# Patient Record
Sex: FEMALE | Race: WHITE | URBAN - METROPOLITAN AREA
[De-identification: names, ages, dates, MRNs, and addresses within clinical notes are randomized per-mention and may not be internally consistent; named-entity substitution may affect disease eponyms.]

---

## 2021-09-30 ENCOUNTER — HOSPITAL ENCOUNTER (INPATIENT)
Facility: HOSPITAL | Age: 61
LOS: 5 days | Discharge: HOME | DRG: 479 | End: 2021-10-05
Attending: EMERGENCY MEDICINE | Admitting: HOSPITALIST
Payer: COMMERCIAL

## 2021-09-30 DIAGNOSIS — M54.9 INTRACTABLE BACK PAIN: Primary | ICD-10-CM

## 2021-09-30 DIAGNOSIS — R93.7 ABNORMAL COMPUTED TOMOGRAPHY OF LUMBAR SPINE: ICD-10-CM

## 2021-09-30 DIAGNOSIS — R79.89 AZOTEMIA: ICD-10-CM

## 2021-09-30 PROCEDURE — 96374 THER/PROPH/DIAG INJ IV PUSH: CPT

## 2021-09-30 PROCEDURE — 99285 EMERGENCY DEPT VISIT HI MDM: CPT

## 2021-09-30 PROCEDURE — 85610 PROTHROMBIN TIME: CPT | Performed by: EMERGENCY MEDICINE

## 2021-09-30 PROCEDURE — 80053 COMPREHEN METABOLIC PANEL: CPT | Performed by: EMERGENCY MEDICINE

## 2021-09-30 PROCEDURE — 85025 COMPLETE CBC W/AUTO DIFF WBC: CPT | Performed by: EMERGENCY MEDICINE

## 2021-09-30 PROCEDURE — 96375 TX/PRO/DX INJ NEW DRUG ADDON: CPT

## 2021-09-30 PROCEDURE — 81001 URINALYSIS AUTO W/SCOPE: CPT | Performed by: EMERGENCY MEDICINE

## 2021-09-30 RX ORDER — ONDANSETRON 2 MG/ML
4 INJECTION INTRAMUSCULAR; INTRAVENOUS ONCE
Status: COMPLETED | OUTPATIENT
Start: 2021-09-30 | End: 2021-09-30

## 2021-09-30 RX ORDER — ACETAMINOPHEN 325 MG/1
650 TABLET ORAL EVERY 6 HOURS PRN
Status: DISCONTINUED | OUTPATIENT
Start: 2021-09-30 | End: 2021-10-05

## 2021-09-30 RX ORDER — HYDROMORPHONE HYDROCHLORIDE 1 MG/ML
1 INJECTION, SOLUTION INTRAMUSCULAR; INTRAVENOUS; SUBCUTANEOUS EVERY 30 MIN PRN
Status: DISCONTINUED | OUTPATIENT
Start: 2021-09-30 | End: 2021-09-30

## 2021-09-30 RX ORDER — HYDROMORPHONE HYDROCHLORIDE 1 MG/ML
0.5 INJECTION, SOLUTION INTRAMUSCULAR; INTRAVENOUS; SUBCUTANEOUS EVERY 30 MIN PRN
Status: DISCONTINUED | OUTPATIENT
Start: 2021-09-30 | End: 2021-09-30

## 2021-09-30 RX ORDER — HYDROMORPHONE HYDROCHLORIDE 1 MG/ML
0.8 INJECTION, SOLUTION INTRAMUSCULAR; INTRAVENOUS; SUBCUTANEOUS EVERY 2 HOUR PRN
Status: DISCONTINUED | OUTPATIENT
Start: 2021-09-30 | End: 2021-10-05

## 2021-09-30 RX ORDER — HYDROMORPHONE HYDROCHLORIDE 1 MG/ML
0.2 INJECTION, SOLUTION INTRAMUSCULAR; INTRAVENOUS; SUBCUTANEOUS EVERY 2 HOUR PRN
Status: DISCONTINUED | OUTPATIENT
Start: 2021-09-30 | End: 2021-10-05

## 2021-09-30 RX ORDER — DOCUSATE SODIUM 100 MG/1
100 CAPSULE, LIQUID FILLED ORAL 2 TIMES DAILY
COMMUNITY
End: 2021-10-08

## 2021-09-30 RX ORDER — HYDROMORPHONE HYDROCHLORIDE 1 MG/ML
0.4 INJECTION, SOLUTION INTRAMUSCULAR; INTRAVENOUS; SUBCUTANEOUS EVERY 2 HOUR PRN
Status: DISCONTINUED | OUTPATIENT
Start: 2021-09-30 | End: 2021-10-05

## 2021-09-30 RX ORDER — ONDANSETRON 2 MG/ML
4 INJECTION INTRAMUSCULAR; INTRAVENOUS EVERY 4 HOURS PRN
Status: DISCONTINUED | OUTPATIENT
Start: 2021-09-30 | End: 2021-09-30

## 2021-09-30 RX ORDER — PANTOPRAZOLE SODIUM 20 MG/1
20 TABLET, DELAYED RELEASE ORAL
Status: DISCONTINUED | OUTPATIENT
Start: 2021-10-01 | End: 2021-10-05

## 2021-09-30 RX ORDER — DOCUSATE SODIUM 100 MG/1
100 CAPSULE, LIQUID FILLED ORAL 2 TIMES DAILY
Status: DISCONTINUED | OUTPATIENT
Start: 2021-09-30 | End: 2021-10-05

## 2021-09-30 RX ORDER — ONDANSETRON 2 MG/ML
4 INJECTION INTRAMUSCULAR; INTRAVENOUS EVERY 6 HOURS PRN
Status: DISCONTINUED | OUTPATIENT
Start: 2021-09-30 | End: 2021-10-05

## 2021-09-30 RX ORDER — TIZANIDINE 4 MG/1
4 TABLET ORAL EVERY 8 HOURS PRN
Status: DISCONTINUED | OUTPATIENT
Start: 2021-09-30 | End: 2021-10-05

## 2021-10-01 ENCOUNTER — APPOINTMENT (OUTPATIENT)
Dept: CT IMAGING | Facility: HOSPITAL | Age: 61
DRG: 479 | End: 2021-10-01
Attending: INTERNAL MEDICINE
Payer: COMMERCIAL

## 2021-10-01 PROCEDURE — 77012 CT SCAN FOR NEEDLE BIOPSY: CPT | Performed by: INTERNAL MEDICINE

## 2021-10-01 PROCEDURE — 99152 MOD SED SAME PHYS/QHP 5/>YRS: CPT | Performed by: INTERNAL MEDICINE

## 2021-10-01 PROCEDURE — 88304 TISSUE EXAM BY PATHOLOGIST: CPT | Performed by: INTERNAL MEDICINE

## 2021-10-01 PROCEDURE — 20225 BONE BIOPSY TROCAR/NDL DEEP: CPT | Performed by: INTERNAL MEDICINE

## 2021-10-01 PROCEDURE — 88342 IMHCHEM/IMCYTCHM 1ST ANTB: CPT | Performed by: INTERNAL MEDICINE

## 2021-10-01 PROCEDURE — 0QB03ZX EXCISION OF LUMBAR VERTEBRA, PERCUTANEOUS APPROACH, DIAGNOSTIC: ICD-10-PCS | Performed by: RADIOLOGY

## 2021-10-01 PROCEDURE — 88312 SPECIAL STAINS GROUP 1: CPT | Performed by: INTERNAL MEDICINE

## 2021-10-01 PROCEDURE — 99153 MOD SED SAME PHYS/QHP EA: CPT | Performed by: INTERNAL MEDICINE

## 2021-10-01 PROCEDURE — 88365 INSITU HYBRIDIZATION (FISH): CPT | Performed by: INTERNAL MEDICINE

## 2021-10-01 PROCEDURE — 88311 DECALCIFY TISSUE: CPT | Performed by: INTERNAL MEDICINE

## 2021-10-01 PROCEDURE — 88341 IMHCHEM/IMCYTCHM EA ADD ANTB: CPT | Performed by: INTERNAL MEDICINE

## 2021-10-01 PROCEDURE — 96375 TX/PRO/DX INJ NEW DRUG ADDON: CPT

## 2021-10-01 RX ORDER — MIDAZOLAM HYDROCHLORIDE 1 MG/ML
INJECTION INTRAMUSCULAR; INTRAVENOUS
Status: COMPLETED
Start: 2021-10-01 | End: 2021-10-01

## 2021-10-01 RX ORDER — FLUMAZENIL 0.1 MG/ML
0.2 INJECTION, SOLUTION INTRAVENOUS AS NEEDED
Status: DISCONTINUED | OUTPATIENT
Start: 2021-10-01 | End: 2021-10-01 | Stop reason: HOSPADM

## 2021-10-01 RX ORDER — HYDROCODONE BITARTRATE AND ACETAMINOPHEN 10; 325 MG/1; MG/1
1 TABLET ORAL EVERY 4 HOURS PRN
Status: DISCONTINUED | OUTPATIENT
Start: 2021-10-01 | End: 2021-10-05

## 2021-10-01 RX ORDER — NALOXONE HYDROCHLORIDE 0.4 MG/ML
80 INJECTION, SOLUTION INTRAMUSCULAR; INTRAVENOUS; SUBCUTANEOUS AS NEEDED
Status: DISCONTINUED | OUTPATIENT
Start: 2021-10-01 | End: 2021-10-01 | Stop reason: HOSPADM

## 2021-10-01 RX ORDER — HYDROCODONE BITARTRATE AND ACETAMINOPHEN 10; 325 MG/1; MG/1
2 TABLET ORAL EVERY 4 HOURS PRN
Status: DISCONTINUED | OUTPATIENT
Start: 2021-10-01 | End: 2021-10-05

## 2021-10-01 RX ORDER — SODIUM CHLORIDE 9 MG/ML
INJECTION, SOLUTION INTRAVENOUS CONTINUOUS
Status: DISCONTINUED | OUTPATIENT
Start: 2021-10-01 | End: 2021-10-01 | Stop reason: HOSPADM

## 2021-10-01 RX ORDER — MIDAZOLAM HYDROCHLORIDE 1 MG/ML
1 INJECTION INTRAMUSCULAR; INTRAVENOUS EVERY 5 MIN PRN
Status: DISCONTINUED | OUTPATIENT
Start: 2021-10-01 | End: 2021-10-01 | Stop reason: HOSPADM

## 2021-10-01 NOTE — H&P
[facility] Hospitalist H&P       CC: Patient presents with:  Back Pain       PCP: Justin Vogt MD    History of Present Illness:    Patient is a 57-year-old female with past medical history of GERD, obesity, hyperlipidemia, presents from oncologist office for an Obesity (BMI 30.0-34. 9)    • Skin cancer, upper extremity, left     MOHS        PSH  Past Surgical History:   Procedure Laterality Date   • COLONOSCOPY      2019 Murel Senia ? 10 yr recall   • KNEE ARTHROSCOPY      left knee meniscal        ALL:  No Known Aller 97.3 °F (36.3 °C) (Oral)   Resp 16   Ht 5' 1\" (1.549 m)   Wt 166 lb (75.3 kg)   LMP 08/01/2007 (Approximate)   SpO2 97%   BMI 31.37 kg/m²     BP Readings from Last 3 Encounters:  10/01/21 : 135/77  09/30/21 : 122/78  09/22/21 : 124/78    Wt Readings from -CT abdomen pelvis pending read  -We will try to obtain records of the outside MRI  -Bone scan with pagets disease and compression fracture of the superior end plate of L4   -Heme-onc consulted, -  -PT OT  -IR for CT-guided biopsy and kyphoplasty?  -Pain

## 2021-10-01 NOTE — PLAN OF CARE
Pt A & O x 4, on 2L oxygen after IV pain meds given. /IS. NPO for CT biopsy at 1400 today. SCDs. Last BM 9/30. IV dilaudid PRN pain. Up x sba. Pt c/o muscle spasms that come and go. Will continue to monitor.

## 2021-10-01 NOTE — PLAN OF CARE
NURSING ADMISSION NOTE      Patient admitted via Cart  Oriented to room. Safety precautions initiated. Bed in low position. Call light in reach. Received from ED awake, A/Ox4. Afebrile, /63.  Ambulated independently to toilet, gait steady, void

## 2021-10-01 NOTE — IMAGING NOTE
Pt post lumbar spine bone biopsy. Tolerated procedure and sedation well. Pain not controlled not responding to pain meds. . Biospy site lower lumbar CDI with tegaderm. Alert and oriented. RN to escort to floor. Chay Mcintyre

## 2021-10-01 NOTE — ED PROVIDER NOTES
Patient Seen in: [facility] Emergency Department      History   Patient presents with:  Back Pain    Stated Complaint: back pain     Subjective:   HPI    75-year-old female presents to the emergency department for hospitalization for pain control an are as noted in HPI. Constitutional and vital signs reviewed. All other systems reviewed and negative except as noted above.     Physical Exam     ED Triage Vitals [09/30/21 1756]   /79   Pulse 88   Resp 17   Temp 97.6 °F (36.4 °C)   Temp src Te Normal   CBC WITH DIFFERENTIAL WITH PLATELET    Narrative: The following orders were created for panel order CBC With Differential With Platelet.   Procedure                               Abnormality         Status                     ---------

## 2021-10-01 NOTE — CONSULTS
[facility]  Report of Consultation    Samara Iyer Patient Status:  Observation    9/3/1960 MRN [id number]   [facility] 3SW-A Attending Edgardo Rincon DO   Hosp Day # 0 PCP Amanda Ellison MD     ADMIT DATE AND TIME: 2021  8:11 drugs. Allergies:  No Known Allergies    Medications:    Current Facility-Administered Medications:   •  HYDROmorphone HCl (DILAUDID) 1 MG/ML injection 0.2 mg, 0.2 mg, Intravenous, Q2H PRN **OR** HYDROmorphone HCl (DILAUDID) 1 MG/ML injection 0.4 mg, 0. 8.5 - 10.1 mg/dL    Calculated Osmolality 280 275 - 295 mOsm/kg    GFR, Non- 94 >=60    GFR, -American 109 >=60    AST 34 15 - 37 U/L    ALT 60 (H) 13 - 56 U/L    Alkaline Phosphatase 143 (H) 50 - 130 U/L    Bilirubin, Total 0.5 0.1 x10 (3) uL    Neutrophil Absolute 9.74 (H) 1.50 - 7.70 x10(3) uL    Lymphocyte Absolute 1.19 1.00 - 4.00 x10(3) uL    Monocyte Absolute 0.69 0.10 - 1.00 x10(3) uL    Eosinophil Absolute 0.03 0.00 - 0.70 x10(3) uL    Basophil Absolute 0.03 0.00 - 0.20 x10(3

## 2021-10-01 NOTE — PROCEDURES
[facility]  Procedure Note    Cindy Loliradha Patient Status:  Observation    9/3/1960 MRN [id number]   [facility] 3SW-A Attending Lydia Avalos MD   Hosp Day # 0 PCP Ilana Dunbar MD     Procedure: CT guided bone biopsy    Pre-Procedu

## 2021-10-02 NOTE — PLAN OF CARE
Plan of care discussed with patient this am. She is up ad rubi in room, tolerating walking and standing, states her pain worse with sitting and lying. States she had hard time sleeping last night. Back gauze/telfa drsg noted intact with old drainage noted.

## 2021-10-02 NOTE — PLAN OF CARE
Zanaflex given for lower back muscle spasms with good result. Pt able to sleep afterwards. Pt prefers to sit up in the chair or have [address] on sitting position, unable to tolerate laying flat in bed d/t to pain.  Moves all extremities without difficulty, no n/

## 2021-10-02 NOTE — PROGRESS NOTES
[facility] Hospitalist Progress Note     [facility]      SUBJECTIVE:    Patient states pain is much better controlled stopped all IV Dilaudid and has been only on half of Norco and pain is well controlled she really wants to get the kyphoplasty done lashaun manual techniques for patient specific dose reduction were followed while maintaining the necessary diagnostic image quality. ADVERSE REACTION: None. FINDINGS: LUNG BASES: There is a calcified granuloma in the right lung middle lobe.  There is linear atelec vertebral body  DESCRIPTION:  Witnessed verbal and written informed consent was obtained. Time out was performed by the staff. A CT-guided biopsy was performed in the usual sterile manner. Dose reduction techniques were used.  Dose information is transmit spine 9/21/2021. Previous report from MRI is unavailable. TECHNIQUE:   27.4 mCi of technetium 99m-labeled MDP were administered intravenously, followed by the acquisition of total body static planar images.  SPECT-CT images of the lumbar spine and pelvis, i obesity, hyperlipidemia, presents from oncologist office for an L1 and L4 vertebral lesion and low back pain     #Low back pain  #L1 L4 vertebral lesion marrow edema  #pagets disease   -CT abdomen pelvis -compatible with compatible with Paget's disease wit

## 2021-10-03 PROCEDURE — 80048 BASIC METABOLIC PNL TOTAL CA: CPT | Performed by: HOSPITALIST

## 2021-10-03 PROCEDURE — 85025 COMPLETE CBC W/AUTO DIFF WBC: CPT | Performed by: HOSPITALIST

## 2021-10-03 NOTE — PLAN OF CARE
Lower back pain and spasms well managed with Norco and Tizanidine. Remains intolerant of laying flat in bed, more comfortable when sitting, standing and ambulating. Low grade temperature last night, encouraged hourly use of IS, coughing and deep breathing.

## 2021-10-03 NOTE — PLAN OF CARE
Plan of care discussed with patient this am. Patient up in room, washed this am in bathroom. She is still unable to lay flat or down in bed, due to severe pain in lower back.  She is up in chair and walking in room/halls freq with her own walking cane from

## 2021-10-03 NOTE — PROGRESS NOTES
[facility] Hospitalist Progress Note     [facility]      SUBJECTIVE:    Doing well pain well controlled chest normal pills awaiting kyphoplasty by IR tomorrow-we will confirm with IR schedule so patient can get kyphoplasty insistent that her pain is uncont Isovue 370 were administered intravenously. Automated exposure control and ALARA manual techniques for patient specific dose reduction were followed while maintaining the necessary diagnostic image quality. ADVERSE REACTION: None.  FINDINGS: LUNG BASES: The (CPT=77012/15147)  COMPARISON:  None. INDICATIONS:  Sclerotic lesion of L4 vertebral body  DESCRIPTION:  Witnessed verbal and written informed consent was obtained. Time out was performed by the staff.  A CT-guided biopsy was performed in the usual steril without sciatica. COMPARISON: CT abdomen pelvis 9/30/2021, outside MRI lumbar spine 9/21/2021. Previous report from MRI is unavailable.  TECHNIQUE:   27.4 mCi of technetium 99m-labeled MDP were administered intravenously, followed by the acquisition of tota PRN         Assessment/Plan:     59year old female past medical history of GERD, obesity, hyperlipidemia, presents from oncologist office for an L1 and L4 vertebral lesion and low back pain     #Low back pain  #L1 L4 vertebral lesion marrow edema  #pagets

## 2021-10-04 ENCOUNTER — APPOINTMENT (OUTPATIENT)
Dept: INTERVENTIONAL RADIOLOGY/VASCULAR | Facility: HOSPITAL | Age: 61
DRG: 479 | End: 2021-10-04
Attending: HOSPITALIST
Payer: COMMERCIAL

## 2021-10-04 PROCEDURE — 0QU03JZ SUPPLEMENT LUMBAR VERTEBRA WITH SYNTHETIC SUBSTITUTE, PERCUTANEOUS APPROACH: ICD-10-PCS | Performed by: RADIOLOGY

## 2021-10-04 PROCEDURE — 80048 BASIC METABOLIC PNL TOTAL CA: CPT | Performed by: HOSPITALIST

## 2021-10-04 PROCEDURE — 22514 PERQ VERTEBRAL AUGMENTATION: CPT

## 2021-10-04 PROCEDURE — 99152 MOD SED SAME PHYS/QHP 5/>YRS: CPT

## 2021-10-04 PROCEDURE — 99153 MOD SED SAME PHYS/QHP EA: CPT

## 2021-10-04 PROCEDURE — 0QS03ZZ REPOSITION LUMBAR VERTEBRA, PERCUTANEOUS APPROACH: ICD-10-PCS | Performed by: RADIOLOGY

## 2021-10-04 PROCEDURE — 85025 COMPLETE CBC W/AUTO DIFF WBC: CPT | Performed by: HOSPITALIST

## 2021-10-04 RX ORDER — LIDOCAINE HYDROCHLORIDE 10 MG/ML
INJECTION, SOLUTION INFILTRATION; PERINEURAL
Status: COMPLETED
Start: 2021-10-04 | End: 2021-10-04

## 2021-10-04 RX ORDER — DOCUSATE SODIUM 100 MG/1
100 CAPSULE, LIQUID FILLED ORAL 2 TIMES DAILY
Status: DISCONTINUED | OUTPATIENT
Start: 2021-10-04 | End: 2021-10-04

## 2021-10-04 RX ORDER — POLYETHYLENE GLYCOL 3350 17 G/17G
17 POWDER, FOR SOLUTION ORAL DAILY PRN
Status: DISCONTINUED | OUTPATIENT
Start: 2021-10-04 | End: 2021-10-05

## 2021-10-04 RX ORDER — CEFAZOLIN SODIUM/WATER 2 G/20 ML
SYRINGE (ML) INTRAVENOUS
Status: COMPLETED
Start: 2021-10-04 | End: 2021-10-04

## 2021-10-04 RX ORDER — SODIUM PHOSPHATE, DIBASIC AND SODIUM PHOSPHATE, MONOBASIC 7; 19 G/133ML; G/133ML
1 ENEMA RECTAL ONCE AS NEEDED
Status: DISCONTINUED | OUTPATIENT
Start: 2021-10-04 | End: 2021-10-05

## 2021-10-04 RX ORDER — MIDAZOLAM HYDROCHLORIDE 1 MG/ML
INJECTION INTRAMUSCULAR; INTRAVENOUS
Status: COMPLETED
Start: 2021-10-04 | End: 2021-10-04

## 2021-10-04 RX ORDER — BISACODYL 10 MG
10 SUPPOSITORY, RECTAL RECTAL
Status: DISCONTINUED | OUTPATIENT
Start: 2021-10-04 | End: 2021-10-05

## 2021-10-04 NOTE — PROCEDURES
[facility]  Procedure Note    Ashlee Carnes Patient Status:  Inpatient    9/3/1960 MRN [id number]   [facility]   Hosp Day # 4 PCP Sushila Herrmann MD     Procedure: L4 kyphoplasty    Pre-

## 2021-10-04 NOTE — PROGRESS NOTES
[facility]  Progress Note    Olman Lazar Patient Status:  Observation    9/3/1960 MRN [id number]   [facility] 3SW-A Attending El Castro MD   Hosp Day # 0 PCP Abel Galvan MD     ADMISSION DATE AND TIME: 2021  8:11 PM    A x10(3) uL    Eosinophil Absolute 0.11 0.00 - 0.70 x10(3) uL    Basophil Absolute 0.03 0.00 - 0.20 x10(3) uL    Immature Granulocyte Absolute 0.02 0.00 - 1.00 x10(3) uL    Neutrophil % 57.6 %    Lymphocyte % 26.0 %    Monocyte % 12.9 %    Eosinophil % 2.4 %

## 2021-10-04 NOTE — PLAN OF CARE
Pt A&Ox4. VSS on room air- IS encouraged. Refusing SCDs. Puncture site to lower back dressed w/ gauze, Telfa- small amount sanguineous drainage. Tolerating regular diet. Voiding w/o difficulty; last BM 9/30/2021- bowel regimen ordered.  Pain controlled w/ I

## 2021-10-04 NOTE — PLAN OF CARE
A/o x4. RA/. Declines SCD's/ankle pumps encouraged. NPO denies n/v/d. Voiding without difficulty. LBM 9/30. Pain managed with Po pain medication pt declines IV pain medication pt states she doesn't how they make her feel. Up ad rubi with personal cane.

## 2021-10-05 VITALS
DIASTOLIC BLOOD PRESSURE: 63 MMHG | HEART RATE: 80 BPM | TEMPERATURE: 98 F | HEIGHT: 61 IN | OXYGEN SATURATION: 93 % | SYSTOLIC BLOOD PRESSURE: 115 MMHG | WEIGHT: 166 LBS | BODY MASS INDEX: 31.34 KG/M2 | RESPIRATION RATE: 18 BRPM

## 2021-10-05 PROCEDURE — 97116 GAIT TRAINING THERAPY: CPT

## 2021-10-05 PROCEDURE — 85025 COMPLETE CBC W/AUTO DIFF WBC: CPT | Performed by: HOSPITALIST

## 2021-10-05 PROCEDURE — 80048 BASIC METABOLIC PNL TOTAL CA: CPT | Performed by: HOSPITALIST

## 2021-10-05 PROCEDURE — 97161 PT EVAL LOW COMPLEX 20 MIN: CPT

## 2021-10-05 RX ORDER — BISACODYL 10 MG
10 SUPPOSITORY, RECTAL RECTAL
Qty: 30 SUPPOSITORY | Refills: 0 | Status: SHIPPED | OUTPATIENT
Start: 2021-10-05 | End: 2021-10-08

## 2021-10-05 RX ORDER — HYDROCODONE BITARTRATE AND ACETAMINOPHEN 5; 300 MG/1; MG/1
1 TABLET ORAL EVERY 4 HOURS PRN
Qty: 60 TABLET | Refills: 0 | Status: SHIPPED | OUTPATIENT
Start: 2021-10-05 | End: 2021-10-08 | Stop reason: ALTCHOICE

## 2021-10-05 RX ORDER — POLYETHYLENE GLYCOL 3350 17 G/17G
17 POWDER, FOR SOLUTION ORAL DAILY PRN
Qty: 5 EACH | Refills: 0 | Status: SHIPPED | OUTPATIENT
Start: 2021-10-05 | End: 2021-10-08

## 2021-10-05 NOTE — PAYOR COMM NOTE
--------------  CONTINUED STAY REVIEW    Payor: [address] [place] [address] ERIKA/BENJAMIN  Subscriber #:  [id number]  Authorization Number: [id number]    Admit date: 9/30/21  Admit time: 10:40 PM    Admitting Physician: Linsey Johnson MD  Attending Physician:  Dwayne Baires pain without sciatica. COMPARISON STUDY: None available.  TECHNIQUE:  Axial images of the abdomen and pelvis were performed from the lung bases through the pubic symphysis after the injection of nonionic intravenous contrast.  Oral contrast was used for the L4 vertebral bodies with fracture along the superior endplate of L4 2. Prominent appearing endometrium. Consider ultrasound of the pelvis for further evaluation 3.  Hiatal hernia      CT BIOPSY BONE DEEP (CPT=77012/10308)     Result Date: 10/1/2021  PROCEDU Finalized by (CST): Kenton Gonzalez MD on 10/01/2021 at 4:49 PM        NM BONE SCAN WB SPECT (MULTI) 1 DAY (FHA=78797/30538)     Result Date: 10/1/2021  DATE OF SERVICE: 09.30.2021 NM BONE SCAN WB SPECT CLINICAL INDICATION:59years-old Female with  Acute veronika [facility]) injection 4 mg, 4 mg, Intravenous, Q6H PRN  acetaminophen (TYLENOL) tab 650 mg, 650 mg, Oral, Q6H PRN  docusate sodium (COLACE) cap 100 mg, 100 mg, Oral, BID  pantoprazole (PROTONIX) EC tab 20 mg, 20 mg, Oral, QAM AC  tiZANidine (ZANAFLEX) tab 4 m distress, alert and oriented x3, no focal neurologic deficits  Pulm: Lungs clear, normal respiratory effort  CV: Heart with regular rate and rhythm, no peripheral edema  Abd: Abdomen soft, nontender, nondistended, no organomegaly, bowel sounds present  MSK ADRENAL GLANDS: Normal. KIDNEYS URETERS AND BLADDER: Normal. PELVIC ORGANS: The endometrium appears heterogeneous and prominent. There are also calcifications in the myometrium which could be related to fibroids.  Ultrasound of the pelvis is recommended for SEDATION:  29 MINUTES  FINDINGS:  IMAGING:  Heterogeneous sclerosis of L4 vertebral body. BIOPSY NEEDLE:  11 gauge OnControl. SPECIMEN TYPE, #, LOCATION:  Single core sample obtained from L4 vertebral body via the right transpedicular approach.   Avery John seen throughout the L1 and L4 vertebrae as well as the bones in the left hemipelvis including the left iliac bone pubic bone and issue. These areas of uptake correspond to areas of increased sclerotic density with cortical and trabecular thickening on CT. lesion marrow edema  #pagets disease   #Compression fracture of L4  -CT abdomen pelvis -compatible with compatible with Paget's disease with fracture along the superior endplate of L4  -MRI records sent over by Dr. Navin Hull reviewed  -Bone scan with pagets d GFR, Non- 94 >=60     GFR, -American 109 >=60   CBC W/ DIFFERENTIAL     Collection Time: 10/05/21  5:46 AM   Result Value Ref Range     WBC 7.9 4.0 - 11.0 x10(3) uL     RBC 4.38 3.80 - 5.30 x10(6)uL     HGB 12.6 12.0 - 16.0 g/dL     me to participate in the care of your patient.  MD Rubi Penny MD               Electronically signed by Cheo Anne MD at 10/5/2021  8:22 AM    MEDICATIONS ADMINISTERED IN LAST 1 DAY:  docusate sodium (COLACE) cap 100 mg

## 2021-10-05 NOTE — PAYOR COMM NOTE
--------------  ADMISSION REVIEW     Payor: Denise JAMES/BENJAMIN  Subscriber #:  [id number]  Authorization Number: [id number]    Admit date: 9/30/21  Admit time: 10:40 PM       REVIEW DOCUMENTATION:     ED Provider Notes      ED Provider Notes signed by [facility] Ankur GARCIA were reviewed per nurse's notes. HEENT: Normocephalic atraumatic. Anicteric sclera. Oral mucosa is moist.  Oropharynx is normal.  Neck: No adenopathy or thyromegaly. Lungs are clear to auscultation.   Heart exam: Normal S1-S2 without extra sounds or mur analgesics. Case was discussed with [facility] hospitalist Dr. Matt Rudd. Arrangements were made for hospitalization. Treatment plan was discussed with the patient.   Order for interventional radiology was placed on the patient's medical record to be obtained to numbness bowel or urinary incontinence.   Her major complaint is low back pain-that she has had for about a month-    She has had prior work-up including a normal SPEP, ESR, LDH and beta-2 microglobulin are normal.  CT abdomen pelvis report is still pending polydipsia  Hematologic: Denies abnormal bleeding or bruising     OBJECTIVE:  /77 (BP Location: Right arm)   Pulse 87   Temp 97.3 °F (36.3 °C) (Oral)   Resp 16   Ht 5' 1\" (1.549 m)   Wt 166 lb (75.3 kg)   LMP 08/01/2007 (Approximate)   SpO2 97%   BM office for an L1 and L4 vertebral lesion and low back pain    #Low back pain  #L1 L4 vertebral lesion marrow edema  #pagets disease   -CT abdomen pelvis pending read  -We will try to obtain records of the outside MRI  -Bone scan with pagets disease and com • Fibrocystic breast changes of both breasts     • Former heavy tobacco smoker       >30 pack year   • GERD (gastroesophageal reflux disease)     • Lung nodule       2019. Nl PFTs. CT 2/21 stable granuloma and no nodules.  check yearly   • Mixed hyperlipi 1\"), weight 75.3 kg (166 lb), last menstrual period 08/01/2007, SpO2 97 %, not currently breastfeeding.     Performance Status: 2               General: Patient is alert and oriented x 3, not in acute distress. HEENT: No Icterus.  Oropharynx Negative     pH Urine 5.0 5.0 - 8.0     Protein Urine Negative Negative mg/dL     Urobilinogen Urine <2.0 <2.0 mg/dL     Nitrite Urine Negative Negative     Leukocyte Esterase Urine Negative Negative     WBC Urine 1-5 0 - 5 /HPF     RBC Urine 0-2 0 - 2 /HP       PROBLEM LIST:      Patient Active Problem List:     Mixed hyperlipidemia     Obesity (BMI 30.0-34. 9)     NAFLD (nonalcoholic fatty liver disease)     Lung nodule     Former heavy tobacco smoker     Intractable back pain     Abnormal computed tomogr Labs   Lab 09/30/21 2039   WBC 11.7*   HGB 12.8   MCV 84.2   .0   INR 1.00             Recent Labs   Lab 09/30/21 2039   *   K 4.1      CO2 24.0   BUN 15   CREATSERUM 0.66   CA 9.3   GLU 86             Recent Labs   Lab 09/30/21 2039 the colon. Appendix is unremarkable. PERITONEUM: No free fluid.  ABDOMINAL WALL: Normal. OSSEOUS STRUCTURES: There is increased sclerotic density in the bones the left hemipelvis with cortical and trabecular thickening as well as in the L1 and L4 vertebral radiologist at and recorded in the patient's medical record. The total intraservice time of the conscious sedation was 29 minutes. The patient tolerated the procedure well and left the department in stable, unchanged condition. COMPLICATIONS:  None.   PATHO along the central superior endplate of L4         Meds:   No current outpatient medications on file.        HYDROcodone-acetaminophen (NORCO)  MG per tab 1 tablet, 1 tablet, Oral, Q4H PRN   Or  HYDROcodone-acetaminophen (NORCO)  MG per tab 2 ta

## 2021-10-05 NOTE — PROGRESS NOTES
[facility] Hospitalist Progress Note     [facility]      SUBJECTIVE:    Doing well still with significant amount of pain with changing positions from supine to sitting, looking forward to the IR kyphoplasty today for better pain control    OBJECTIVE:  Temp examination. 80 mL of Isovue 370 were administered intravenously. Automated exposure control and ALARA manual techniques for patient specific dose reduction were followed while maintaining the necessary diagnostic image quality. ADVERSE REACTION: None.  FI CT BIOPSY BONE DEEP (CPT=77012/44468)  COMPARISON:  None. INDICATIONS:  Sclerotic lesion of L4 vertebral body  DESCRIPTION:  Witnessed verbal and written informed consent was obtained. Time out was performed by the staff.  A CT-guided biopsy was performe low back pain without sciatica. COMPARISON: CT abdomen pelvis 9/30/2021, outside MRI lumbar spine 9/21/2021. Previous report from MRI is unavailable.  TECHNIQUE:   27.4 mCi of technetium 99m-labeled MDP were administered intravenously, followed by the acqui mg, Intravenous, Q2H PRN  ondansetron (ZOFRAN) injection 4 mg, 4 mg, Intravenous, Q6H PRN  acetaminophen (TYLENOL) tab 650 mg, 650 mg, Oral, Q6H PRN  docusate sodium (COLACE) cap 100 mg, 100 mg, Oral, BID  pantoprazole (PROTONIX) EC tab 20 mg, 20 mg, Oral,

## 2021-10-05 NOTE — PHYSICAL THERAPY NOTE
PHYSICAL THERAPY QUICK EVALUATION - INPATIENT    Room Number: 381/381-A  Evaluation Date: 10/5/2021  Presenting Problem: s/p L4 kyphoplasty 10/4  Physician Order: PT Eval and Treat    Problem List  Principal Problem:    Intractable back pain  Active Prob ASSESSMENT  Upper extremity ROM and strength are within functional limits     Lower extremity ROM is within functional limits     Lower extremity strength is within functional limits     NEUROLOGICAL FINDINGS                      ACTIVITY TOLERANCE aware of session/findings; All patient questions and concerns addressed    ASSESSMENT   Patient is a 59year old female admitted on 9/30/2021 for back pain. MRI revealing for bone marrow abnormality at L1 and L4.  Work up resulting in diagnosis of Paget's d

## 2021-10-05 NOTE — PROGRESS NOTES
Pt returned to unit from IR stating pain is 10/10. Pt requesting IV pain medication and not following bedrest orders. Pt educated on post procedure orders and agreeable.

## 2021-10-05 NOTE — PROGRESS NOTES
[facility]  Progress Note    Audrey Rome Patient Status:  Observation    9/3/1960 MRN [id number]   [facility] 3SW-A Attending Lenard Pike MD   Hosp Day # 5 PCP Susan Garcia MD     ADMISSION DATE AND TIME: 2021  8:11 PM    A Immature Granulocyte Absolute 0.02 0.00 - 1.00 x10(3) uL    Neutrophil % 75.6 %    Lymphocyte % 14.6 %    Monocyte % 8.5 %    Eosinophil % 0.9 %    Basophil % 0.1 %    Immature Granulocyte % 0.3 %       No results found for this visit on 09/30/21.     Lanre Albert

## 2021-10-05 NOTE — PLAN OF CARE
A/o x4. RA/. Declines SCD's. Regular diet denies n/v/d. Voiding without difficulty. LBM 9/30. Pain managed with PO pain medication. SB assistance with walker. PT/OT to see pt in AM. Denies n/t. Small telfa gauze with old drainage. IV SL.  All safety surjit

## 2021-10-05 NOTE — PLAN OF CARE
Pt  is AOx4, on R. A.  VS are stable, voiding freely. Denies n/t. Denies leg pain. Telfa dressing at the kypho site was changed this morning. Patient reports her pain being much better today. Declined the need for any pain medication.    Up with Pt, jennifer

## 2021-10-06 NOTE — PAYOR COMM NOTE
Discharge Notification    Patient Name: Petty Jane  Payor: Lowell Anne POS/BENJAMIN  Subscriber #: [id number]  Authorization Number: [id number]  Admit Date/Time: 9/30/2021 8:11 PM  Discharge Date/Time: 10/5/2021 11:15 AM

## 2021-10-06 NOTE — DISCHARGE SUMMARY
General Medicine Discharge Summary     Patient ID:  Brien Vail  59year old  9/3/1960    Admit date: 9/30/2021    Discharge date and time: 10/5/2021 11:15 AM     Attending Physician: No att. providers found     Primary Care Physician: Philip Avila MD of 11.7= UA was negative-    59year old female past medical history of GERD, obesity, hyperlipidemia, presents from oncologist office for an L1 and L4 vertebral lesion and low back pain     #Low back pain  #L1 L4 vertebral lesion marrow edema  #pagets dis encounter. Total Time Coordinating Care: Greater than 30 minutes    Patient had opportunity to ask questions and state understand and agree with therapeutic plan as outlined above.      Thank Ju Sibley MD

## 2021-10-08 PROBLEM — S32.040D COMPRESSION FRACTURE OF L4 VERTEBRA WITH ROUTINE HEALING, SUBSEQUENT ENCOUNTER: Status: ACTIVE | Noted: 2021-10-08

## 2021-11-22 ENCOUNTER — APPOINTMENT (OUTPATIENT)
Dept: GENERAL RADIOLOGY | Facility: HOSPITAL | Age: 61
DRG: 539 | End: 2021-11-22
Attending: EMERGENCY MEDICINE
Payer: COMMERCIAL

## 2021-11-22 ENCOUNTER — APPOINTMENT (OUTPATIENT)
Dept: MRI IMAGING | Facility: HOSPITAL | Age: 61
DRG: 539 | End: 2021-11-22
Attending: EMERGENCY MEDICINE
Payer: COMMERCIAL

## 2021-11-22 ENCOUNTER — HOSPITAL ENCOUNTER (INPATIENT)
Facility: HOSPITAL | Age: 61
LOS: 6 days | Discharge: HOME | DRG: 539 | End: 2021-11-29
Attending: EMERGENCY MEDICINE | Admitting: HOSPITALIST
Payer: COMMERCIAL

## 2021-11-22 DIAGNOSIS — G06.2 EPIDURAL ABSCESS: ICD-10-CM

## 2021-11-22 DIAGNOSIS — M86.9 OSTEOMYELITIS OF OTHER SITE, UNSPECIFIED TYPE (HCC): ICD-10-CM

## 2021-11-22 DIAGNOSIS — M46.46 DISCITIS OF LUMBAR REGION: ICD-10-CM

## 2021-11-22 DIAGNOSIS — M46.20 PARASPINAL ABSCESS (HCC): Primary | ICD-10-CM

## 2021-11-22 PROCEDURE — 71045 X-RAY EXAM CHEST 1 VIEW: CPT | Performed by: EMERGENCY MEDICINE

## 2021-11-22 PROCEDURE — 72158 MRI LUMBAR SPINE W/O & W/DYE: CPT | Performed by: EMERGENCY MEDICINE

## 2021-11-22 RX ORDER — IBUPROFEN 600 MG/1
600 TABLET ORAL ONCE
Status: COMPLETED | OUTPATIENT
Start: 2021-11-22 | End: 2021-11-22

## 2021-11-22 RX ORDER — ONDANSETRON 2 MG/ML
4 INJECTION INTRAMUSCULAR; INTRAVENOUS ONCE
Status: COMPLETED | OUTPATIENT
Start: 2021-11-22 | End: 2021-11-22

## 2021-11-22 RX ORDER — VANCOMYCIN/0.9 % SOD CHLORIDE 1.75 G/5
25 PLASTIC BAG, INJECTION (ML) INTRAVENOUS ONCE
Status: COMPLETED | OUTPATIENT
Start: 2021-11-22 | End: 2021-11-22

## 2021-11-22 RX ORDER — HYDROMORPHONE HYDROCHLORIDE 1 MG/ML
0.5 INJECTION, SOLUTION INTRAMUSCULAR; INTRAVENOUS; SUBCUTANEOUS EVERY 30 MIN PRN
Status: COMPLETED | OUTPATIENT
Start: 2021-11-22 | End: 2021-11-23

## 2021-11-22 RX ORDER — IBUPROFEN 200 MG
400 TABLET ORAL EVERY 6 HOURS PRN
COMMUNITY
End: 2021-12-17

## 2021-11-22 RX ORDER — METRONIDAZOLE 500 MG/100ML
500 INJECTION, SOLUTION INTRAVENOUS EVERY 8 HOURS SCHEDULED
Status: DISCONTINUED | OUTPATIENT
Start: 2021-11-22 | End: 2021-11-23

## 2021-11-22 NOTE — ED INITIAL ASSESSMENT (HPI)
S/P kyphoplasty 10/4 and diagnosed with Paget's disease. Reclast infusion on 11/2 for Paget's. Has been having fever and low back pain. Labs done this morning. Noted high sed rate.

## 2021-11-22 NOTE — ED PROVIDER NOTES
Patient Seen in: [facility] Emergency Department      History   Patient presents with:  Abnormal Result    Stated Complaint: abnormal results     Subjective:   HPI    Patient presents with back pain and elevated sed rate.   The patient states that sh Obesity (BMI 30.0-34. 9)    • Paget's disease of bone at multiple sites 09/2021    L4 compression fx s/p kyphoplasty    • Skin cancer, upper extremity, left     MOHS              Past Surgical History:   Procedure Laterality Date   • COLONOSCOPY      2019 S Reviewed   URINALYSIS WITH CULTURE REFLEX - Normal   RAPID SARS-COV-2 BY PCR - Normal   BLOOD CULTURE   BLOOD CULTURE        MRI SPINE LUMBAR (W+WO) (CPT=72158)    Result Date: 11/22/2021  PROCEDURE:  MRI SPINE LUMBAR (W+WO) (CPT=72158)  COMPARISON:  [facility] patient's known Paget's disease. The possibility of mild compression fracture is in the differential but considered less likely.   L1-2 demonstrates mild degenerative disc disease without significant central foraminal stenosis but the facet joints unremark (CPT=71045)  TECHNIQUE:  AP chest radiograph was obtained.   COMPARISON:  [facility] , CT, CT BIOPSY BONE DEEP (CPT=77012/26580), 10/01/2021, 2:08 PM.  INDICATIONS:  fever  PATIENT STATED HISTORY: (As transcribed by Technologist)  Patient offered no additional surgical procedure at this time. I did consult infectious disease as well who agreed with the antibiotic administration. The patient will be admitted to Dr. Gabriel Kurtz from the [facility] hospitalist service.   Patient was updated on the findings and plan of care

## 2021-11-23 PROBLEM — M46.46 DISCITIS OF LUMBAR REGION: Status: ACTIVE | Noted: 2021-11-23

## 2021-11-23 PROBLEM — G06.2 EPIDURAL ABSCESS: Status: ACTIVE | Noted: 2021-11-23

## 2021-11-23 PROBLEM — M86.9 OSTEOMYELITIS OF OTHER SITE, UNSPECIFIED TYPE (HCC): Status: ACTIVE | Noted: 2021-11-23

## 2021-11-23 RX ORDER — SODIUM CHLORIDE 9 MG/ML
INJECTION, SOLUTION INTRAVENOUS CONTINUOUS
Status: DISCONTINUED | OUTPATIENT
Start: 2021-11-23 | End: 2021-11-23

## 2021-11-23 RX ORDER — ONDANSETRON 2 MG/ML
4 INJECTION INTRAMUSCULAR; INTRAVENOUS EVERY 4 HOURS PRN
Status: ACTIVE | OUTPATIENT
Start: 2021-11-23 | End: 2021-11-23

## 2021-11-23 RX ORDER — POLYETHYLENE GLYCOL 3350 17 G/17G
17 POWDER, FOR SOLUTION ORAL DAILY PRN
Status: DISCONTINUED | OUTPATIENT
Start: 2021-11-23 | End: 2021-11-29

## 2021-11-23 RX ORDER — ONDANSETRON 2 MG/ML
4 INJECTION INTRAMUSCULAR; INTRAVENOUS EVERY 6 HOURS PRN
Status: DISCONTINUED | OUTPATIENT
Start: 2021-11-23 | End: 2021-11-29

## 2021-11-23 RX ORDER — PROCHLORPERAZINE EDISYLATE 5 MG/ML
5 INJECTION INTRAMUSCULAR; INTRAVENOUS EVERY 8 HOURS PRN
Status: DISCONTINUED | OUTPATIENT
Start: 2021-11-23 | End: 2021-11-29

## 2021-11-23 RX ORDER — HYDROCODONE BITARTRATE AND ACETAMINOPHEN 5; 325 MG/1; MG/1
1 TABLET ORAL EVERY 4 HOURS PRN
Status: DISCONTINUED | OUTPATIENT
Start: 2021-11-23 | End: 2021-11-24

## 2021-11-23 RX ORDER — ACETAMINOPHEN 325 MG/1
650 TABLET ORAL EVERY 4 HOURS PRN
Status: DISCONTINUED | OUTPATIENT
Start: 2021-11-23 | End: 2021-11-24

## 2021-11-23 RX ORDER — DOCUSATE SODIUM 100 MG/1
100 CAPSULE, LIQUID FILLED ORAL 2 TIMES DAILY
Status: DISCONTINUED | OUTPATIENT
Start: 2021-11-23 | End: 2021-11-29

## 2021-11-23 RX ORDER — SODIUM CHLORIDE 9 MG/ML
INJECTION, SOLUTION INTRAVENOUS CONTINUOUS
Status: DISCONTINUED | OUTPATIENT
Start: 2021-11-23 | End: 2021-11-26

## 2021-11-23 RX ORDER — HYDROCODONE BITARTRATE AND ACETAMINOPHEN 5; 325 MG/1; MG/1
2 TABLET ORAL EVERY 4 HOURS PRN
Status: DISCONTINUED | OUTPATIENT
Start: 2021-11-23 | End: 2021-11-24

## 2021-11-23 RX ORDER — HYDROMORPHONE HYDROCHLORIDE 1 MG/ML
0.5 INJECTION, SOLUTION INTRAMUSCULAR; INTRAVENOUS; SUBCUTANEOUS EVERY 30 MIN PRN
Status: ACTIVE | OUTPATIENT
Start: 2021-11-23 | End: 2021-11-23

## 2021-11-23 NOTE — PROGRESS NOTES
[facility] ID BRIEF NOTE    ID service consulted for antimicrobial management of lumbar discitis/osteomyelitis with anterior spinal phlegmon and paraspinal abscess. Blood cultures have been obtained in the ED.  Recommend empiric therapy with Vancomycin IV, hi

## 2021-11-23 NOTE — ED QUICK NOTES
Orders for admission, patient is aware of plan and ready to go upstairs. Any questions, please call ED CHAYITO Barber  at [facility]. Vaccinated?  Yes  Type of COVID test sent: Rapid  COVID Suspicion level: Low      Titratable drug(s) infusing: No titra

## 2021-11-23 NOTE — CONSULTS
INFECTIOUS DISEASE [address] Patient Status:  Inpatient    9/3/1960 MRN [id number]   [facility] 3SW-A Attending Deette Sacks, MD   Hosp Day # 0 PCP Stacy Phoenix MD Mother    • Other (brain cancer) Father       reports that she quit smoking about 14 years ago. Her smoking use included cigarettes. She has a 90.00 pack-year smoking history.  She has never used smokeless tobacco. She reports that she does not drink alcoho noted in the the HPI. Physical Exam:    General: No acute distress. Alert and oriented x 3.   Vital signs: Temp:  [98.3 °F (36.8 °C)-98.4 °F (36.9 °C)] 98.3 °F (36.8 °C)  Pulse:  [74-90] 90  Resp:  [12-18] 18  BP: (109-133)/() 114/51  HEENT: Mois phlegmon with thecal sac compression.     Neurosurgery following  -await decision on drainage, decompression  -await microbiology data, tissue cultures    No signs of sepsis, normal wbc, could be indolent organism  Can follow on Ceftriaxone for now, hold va

## 2021-11-23 NOTE — CONSULTS
[facility]  Neurological Surgery Inpatient Consult Note    Shantelle Garibay, 59year old female Patient Status:  Emergency    9/3/1960 MRN [id number]   Location [address] [facility] Attending Clyde Ascencio, [address] [facility] percutaneous needling. 4. In the rare instance where the spine has become unstable and requires instrumented fusion. This scenario is poorly endorsed in the face of active infection, especially since most of these cases go on to spontaneous fusion.

## 2021-11-23 NOTE — PROGRESS NOTES
NURSING ADMISSION NOTE      Patient admitted via cart. Oriented to room. Safety precautions initiated. Bed in low position. Call light in reach. A&Ox4, VSS on room air. Regular diet. LLE weakness. Up standby/ad rubi. Voiding freely; last BM 11/21.  Pt

## 2021-11-23 NOTE — H&P
[facility] Hospitalist H&P       CC: Patient presents with:  Abnormal Result       PCP: Jody Ellis MD    History of Present Illness: 58 y/o w hx of parham, hl, pagets s/p kyphoplasty L4 10/21 p/w increasing back pain, no fall, mri back with discitis and osteo L3- Types: Cigarettes        Quit date: 2007        Years since quittin.5      Smokeless tobacco: Never Used    Alcohol use: Never       Fam Hx  Family History   Problem Relation Age of Onset   • Colon Cancer Maternal Aunt    • Dementia Mother    • O hours. Radiology: MRI SPINE LUMBAR (W+WO) ([id number]=[id number])    Result Date: 11/22/2021  CONCLUSION:   1. Discitis / osteomyelitis at L3-4.  2. Anterior epidural large phlegmon causing mass effect on the thecal sac at L3-4 extending to the L4 level. 3.  L

## 2021-11-23 NOTE — IMAGING NOTE
Spoke with Meri De Leon Rn on the floor, NPO x 6 hours, stat PT/INR stat early am, IV right AC,  Last dose of Ibuprofen 11/22/21 at 1606 pm.

## 2021-11-23 NOTE — PROGRESS NOTES
Patient has a scheduled CT guided drain for abscess tomorrow at 10AM. Patient is to be NPO 6 hrs prior to procedure. PT/INR stat lab requested for tomorrow. Patient is not on any blood thinner medications.  Patient complained of pain on back with little rel

## 2021-11-23 NOTE — PROGRESS NOTES
[facility] Dosing Service    Initial Pharmacokinetic Consult for Vancomycin AUC Dosing    Corinne Delgado is a 59year old patient who is being treated for Epidural abscess. Pharmacy has been asked to dose vancomycin by Dr. Tiffanie Steven.     Weights:  Ideal body

## 2021-11-23 NOTE — PAYOR COMM NOTE
--------------  ADMISSION REVIEW     Payor: Lowell DAVIS  Subscriber #:  [id number]  Authorization Number: [id number]    Admit date: 11/23/21  Admit time:  6:28 AM       History   HPI  Patient presents with back pain and elevated sed rate.   The patie bowel sounds, no CVA tenderness. Back: Mild tenderness in the upper lumbar spine midline. Extremities: No CCE. Skin: Warm and dry.   Neurologic: Strength 5/5 distal lower extremities-slight proximal weakness in the left lower extremity which the patient Clinical Impression:  Paraspinal abscess ([address]  (primary encounter diagnosis)  Epidural abscess  Discitis of lumbar region  Osteomyelitis of other site, unspecified type [facility])     Disposition:  Admit  11/22/2021  9:30 pm       11/23/21  INFECTIOUS DISEAS elevated CRP and MRI findings of phlegmon with thecal sac compression.     Neurosurgery following  -await decision on drainage, decompression  -await microbiology data, tissue cultures     No signs of sepsis, normal wbc, could be indolent organism  Can fol

## 2021-11-23 NOTE — PROGRESS NOTES
[facility]  Neurological Surgery Inpatient Consult Note    Shantelle Garibay, 59year old female Patient Status:  Inpatient    9/3/1960 MRN [id number]   [facility] 3SW-A Attending Eamon Moore MD   Hosp Day # 0 PCP Nae Carrillo abscess, especially septated abscesses that might be recalcitrant to CT-guided percutaneous needling. Patient has a phlegmon at this time. With a normal exam, no need for emergent surgery. The reported abscess is described as \"paraspinal\".   4.   In th stenosis is mild to moderate at L3-4 and L4-5 levels.    DIAGNOSTIC DATA:   Lab Results   Component Value Date    WBC 10.5 11/23/2021    HGB 11.3 11/23/2021    HCT 35.2 11/23/2021    .0 11/23/2021    CREATSERUM 0.71 11/23/2021    BUN 23 11/23/2021 Tab, Take 100 mg by mouth daily. , Disp: , Rfl: , 11/22/2021 at 0900  Calcium 500 MG Oral Tab, Take 1 tablet by mouth 3 (three) times daily. , Disp: , Rfl: , 11/22/2021 at 0900  Vitamin D3, Cholecalciferol, 25 MCG (1000 UT) Oral Tab, Take 1 tablet (1,000 Uni lymphadenopathy. NEUROLOGICAL:  This patient is alert and orientated x 3. Speech and comprehension is normal. Able to follow simple commands. Pupils equally round and reactive to light. 3+ brisk bilaterally. EOMI. Visual fields are full.   Face is

## 2021-11-24 ENCOUNTER — APPOINTMENT (OUTPATIENT)
Dept: CT IMAGING | Facility: HOSPITAL | Age: 61
DRG: 539 | End: 2021-11-24
Attending: INTERNAL MEDICINE
Payer: COMMERCIAL

## 2021-11-24 PROCEDURE — B5181ZA FLUOROSCOPY OF SUPERIOR VENA CAVA USING LOW OSMOLAR CONTRAST, GUIDANCE: ICD-10-PCS | Performed by: HOSPITALIST

## 2021-11-24 PROCEDURE — 49406 IMAGE CATH FLUID PERI/RETRO: CPT | Performed by: INTERNAL MEDICINE

## 2021-11-24 PROCEDURE — 99152 MOD SED SAME PHYS/QHP 5/>YRS: CPT | Performed by: INTERNAL MEDICINE

## 2021-11-24 PROCEDURE — 02HV33Z INSERTION OF INFUSION DEVICE INTO SUPERIOR VENA CAVA, PERCUTANEOUS APPROACH: ICD-10-PCS | Performed by: HOSPITALIST

## 2021-11-24 PROCEDURE — 009Y3ZZ DRAINAGE OF LUMBAR SPINAL CORD, PERCUTANEOUS APPROACH: ICD-10-PCS | Performed by: RADIOLOGY

## 2021-11-24 PROCEDURE — 99231 SBSQ HOSP IP/OBS SF/LOW 25: CPT | Performed by: NEUROLOGICAL SURGERY

## 2021-11-24 PROCEDURE — 99153 MOD SED SAME PHYS/QHP EA: CPT | Performed by: INTERNAL MEDICINE

## 2021-11-24 RX ORDER — ACETAMINOPHEN 325 MG/1
650 TABLET ORAL EVERY 4 HOURS PRN
Status: DISCONTINUED | OUTPATIENT
Start: 2021-11-24 | End: 2021-11-29

## 2021-11-24 RX ORDER — MIDAZOLAM HYDROCHLORIDE 1 MG/ML
INJECTION INTRAMUSCULAR; INTRAVENOUS
Status: COMPLETED
Start: 2021-11-24 | End: 2021-11-24

## 2021-11-24 RX ORDER — SODIUM CHLORIDE 9 MG/ML
INJECTION, SOLUTION INTRAVENOUS CONTINUOUS
Status: DISCONTINUED | OUTPATIENT
Start: 2021-11-24 | End: 2021-11-24 | Stop reason: HOSPADM

## 2021-11-24 RX ORDER — NALOXONE HYDROCHLORIDE 0.4 MG/ML
80 INJECTION, SOLUTION INTRAMUSCULAR; INTRAVENOUS; SUBCUTANEOUS AS NEEDED
Status: DISCONTINUED | OUTPATIENT
Start: 2021-11-24 | End: 2021-11-24 | Stop reason: HOSPADM

## 2021-11-24 RX ORDER — HYDROCODONE BITARTRATE AND ACETAMINOPHEN 5; 325 MG/1; MG/1
2 TABLET ORAL EVERY 4 HOURS PRN
Status: DISCONTINUED | OUTPATIENT
Start: 2021-11-24 | End: 2021-11-29

## 2021-11-24 RX ORDER — FLUMAZENIL 0.1 MG/ML
0.2 INJECTION, SOLUTION INTRAVENOUS AS NEEDED
Status: DISCONTINUED | OUTPATIENT
Start: 2021-11-24 | End: 2021-11-24 | Stop reason: HOSPADM

## 2021-11-24 RX ORDER — MIDAZOLAM HYDROCHLORIDE 1 MG/ML
1 INJECTION INTRAMUSCULAR; INTRAVENOUS EVERY 5 MIN PRN
Status: COMPLETED | OUTPATIENT
Start: 2021-11-24 | End: 2021-11-24

## 2021-11-24 RX ORDER — HYDROCODONE BITARTRATE AND ACETAMINOPHEN 5; 325 MG/1; MG/1
1 TABLET ORAL EVERY 4 HOURS PRN
Status: DISCONTINUED | OUTPATIENT
Start: 2021-11-24 | End: 2021-11-29

## 2021-11-24 RX ORDER — LIDOCAINE HYDROCHLORIDE 10 MG/ML
5 INJECTION, SOLUTION EPIDURAL; INFILTRATION; INTRACAUDAL; PERINEURAL ONCE
Status: COMPLETED | OUTPATIENT
Start: 2021-11-24 | End: 2021-11-24

## 2021-11-24 NOTE — PROCEDURES
[facility]  Procedure Note    Camryn Robertson Patient Status:  Inpatient    9/3/1960 MRN [id number]   [facility] 3SW-A Attending Yenifer Hendricks,    Hosp Day # 1 PCP William Granda MD     Procedure: para spinal abscess drain    Pre-Pro

## 2021-11-24 NOTE — PROGRESS NOTES
[facility]                INFECTIOUS DISEASE PROGRESS NOTE    Vernita Eisenmenger Patient Status:  Inpatient    9/3/1960 MRN [id number]   [facility] 3SW-A Attending Christian Sommer,    Hosp Day # 1 PCP Ilana Dunbar MD     Antibiotic 11/22/21   1.  Blood Culture     Status: None (Preliminary result)    Collection Time: 11/22/21  7:57 PM    Specimen: Blood,peripheral   Result Value Ref Range    Blood Culture Result No Growth 1 Day N/A         Problem list reviewed:  Patient Active Proble

## 2021-11-24 NOTE — IMAGING NOTE
Report called to michelle Garcia on r/a, dressing CDI, presently denies pain and tolerated procedure well. Pt escorted to [address] with Radiology RN at bedside.

## 2021-11-24 NOTE — PROGRESS NOTES
[facility] Hospitalist Progress Note     Channing Samples Patient Status:  Inpatient    9/3/1960 MRN [id number]   [facility] 3SW-A Attending Isaias Srinivasan DO   Hosp Day # 1 PCP Ivette Rosas MD     Subjective last 168 hours. Cardiac  No results for input(s): TROP, PBNP in the last 168 hours. Creatinine Kinase  No results for input(s): CK in the last 168 hours.     Inflammatory Markers  Recent Labs   Lab 11/22/21  0703   CRP 12.25*       Imaging: Imaging da

## 2021-11-24 NOTE — PROGRESS NOTES
INPATIENT PROGRESS NOTE    Assessment:   Camryn Robertson in room 375/375-A, is a 59year old female, Hospital Day ( LOS: 1 day ) hospitalized for Paraspinal abscess ([facility] [address] The patient's other hospital problems include:    Active Hospital Problems    Epi (WRD=[id number]), 10/01/2021, 2:08 PM.       INDICATIONS:  back pain, fevers and chills, vertebroplasty last month, left leg weakness       TECHNIQUE:  Multiplanar T1 and T2 weighted images including fat suppression sequences.  Images acquired in sagittal demonstrates no disc herniation, central, or foraminal stenosis.  The facet joints are unremarkable.       L3-4 demonstrates moderate degenerative disc bulge with mild bilateral facet joint degenerative change.  There is central canal stenosis of 8 mm at t

## 2021-11-24 NOTE — PLAN OF CARE
Pt A&Ox4, VSS on room air. Refusing SCDs. Up ad rubi. IV rocephin q12h. Pain controlled w/ PO meds. NPO for minimum 6 hrs prior to abscess drainage at 10AM. Voiding freely; last BM 11/21. POC discussed w/ pt at bedside and verbalized understanding.  Safety p

## 2021-11-24 NOTE — CM/SW NOTE
Received an order for IV ABX at [facility]. SW sent a referral to [facility]. Awaiting cultures. No final orders at this time. Met w/pt in regards to IV ABX. Pt agreeable to teach and tain if it is covered by her insurance.  Pt confirmed she is not home bound and does no

## 2021-11-25 NOTE — PLAN OF CARE
ER admit 11/22 w/ spinal abcess , Pt is AAOX4, VSS, room air, MIN drain placed 11/24, small output, PICC line to Rt upper arm SL, w/ IV ABX, up SBA, voiding freely to restroom, PO meds for pain control, cultures pending final results, plan is home w/ IV ABX

## 2021-11-25 NOTE — PROGRESS NOTES
[facility] Hospitalist Progress Note     Viviana Koch Patient Status:  Inpatient    9/3/1960 MRN [id number]   [facility] 3SW-A Attending Lorenzo Shearer DO   Hosp Day # 2 PCP Richard Decker MD     Subjective hours. Cardiac  No results for input(s): TROP, PBNP in the last 168 hours. Creatinine Kinase  No results for input(s): CK in the last 168 hours.     Inflammatory Markers  Recent Labs   Lab 11/22/21  0703   CRP 12.25*       Imaging: Imaging data review

## 2021-11-25 NOTE — PROGRESS NOTES
[facility]                INFECTIOUS DISEASE PROGRESS NOTE    Camryn Robertson Patient Status:  Inpatient    9/3/1960 MRN [id number]   [facility] 3SW-A Attending Yenifer Hendricks DO   Hosp Day # 2 PCP William Granda MD     Antibiotic Aerobic Bacterial Culture     Status: None (Preliminary result)    Collection Time: 11/24/21 11:30 AM    Specimen: Back; Other   Result Value Ref Range    Aerobic Smear 1+ WBCs seen N/A    Aerobic Smear No organisms seen N/A   2.  Blood Culture     Status:

## 2021-11-25 NOTE — PLAN OF CARE
A&O x4, appears anxious at times. VSS on RA. Moderate LLE pain overnight well controlled with PO norco PRN. Up ad rubi. Voids freely. Bilateral SCDs. IR drain placed yesterday, scant output overnight. Drain dressing C/D/I. Final cx pending. Denies N/T.  PICC

## 2021-11-26 RX ORDER — SENNOSIDES 8.6 MG
8.6 TABLET ORAL 2 TIMES DAILY
Status: DISCONTINUED | OUTPATIENT
Start: 2021-11-26 | End: 2021-11-29

## 2021-11-26 RX ORDER — CEFTRIAXONE SODIUM 2 G/50ML
2 INJECTION, SOLUTION INTRAVENOUS EVERY 24 HOURS
Qty: 1950 ML | Refills: 0 | Status: SHIPPED | OUTPATIENT
Start: 2021-11-27 | End: 2022-01-05

## 2021-11-26 NOTE — PAYOR COMM NOTE
--------------  CONTINUED STAY REVIEW    Payor: JOSE JAMES/BENJAMIN  Subscriber #:  [id number]  Authorization Number: [id number]    Admit date: 11/23/21  Admit time:  6:28 AM    FAXING CLINICAL UPDATE FOR 11/25/21 -11/26/21 11/25/21  Subjective:   Melinda Vega rebound or guarding. Extremities: No edema. Neuro:  Grossly non focal, no motor deficits. Lines:  L3-4 paraspinal abscess drain in place.   Lab 11/22/21  0703 11/23/21  0854 11/24/21  0510   WBC 9.50 10.5  --    HGB 12.5 11.3*  --    MCV 86.7 88.7  --

## 2021-11-26 NOTE — PROGRESS NOTES
[facility] Hospitalist Progress Note     Shantelle Fuelling Patient Status:  Inpatient    9/3/1960 MRN [id number]   [facility] 3SW-A Attending Precdante Hines, DO   Hosp Day # 3 PCP Rosalba Mattson MD     Subjective 168 hours. Cardiac  No results for input(s): TROP, PBNP in the last 168 hours. Creatinine Kinase  No results for input(s): CK in the last 168 hours.     Inflammatory Markers  Recent Labs   Lab 11/22/21  0703   CRP 12.25*       Imaging: Imaging data re

## 2021-11-26 NOTE — PROGRESS NOTES
[facility]  Progress Note      [facility] Patient Status:  Inpatient    9/3/1960 MRN [id number]   [facility] 3SW-A Attending Juana Dunn, DO   Hosp Day # 3 PCP Jennifer Spears MD       Subjective:   Resting in bed    Objective:

## 2021-11-26 NOTE — PROGRESS NOTES
Verified with Radiology Dr Hang Joyner regarding drain care and plan for discharge. On call radiologist will see her today and put orders in.

## 2021-11-26 NOTE — PLAN OF CARE
Pt is a/ox4 on room air. Refuse SCD. Tolerating regular diet. Last bowel movement on 11/21. Voiding freely. PO medication given for pain. Right PICC infusing with antibiotic. MIN drain with small output. LLE weakness. Call light with in reach.  Safety precaut

## 2021-11-26 NOTE — CM/SW NOTE
Spoke with Srinivasan Hastings from [facility] ([phone]) regarding status of approval for home infusion, pt with preference for teach and train. Referral sent on Weds but they have not yet verified insurance.   Insurance may be closed today and weekend for [employer]

## 2021-11-26 NOTE — PROGRESS NOTES
[facility]                INFECTIOUS DISEASE PROGRESS NOTE     Patient Status:  Inpatient    9/3/1960 MRN [id number]   [facility] 3SW-A Attending Feliberto Newman, DO   Hosp Day # 3 PCP Jose M Rossi MD     Antibiotic Anaerobic Culture     Status: None (Preliminary result)    Collection Time: 11/24/21 11:30 AM    Specimen: Back; Other   Result Value Ref Range    Anaerobic Culture No Anaerobes to date N/A   2.  Aerobic Bacterial Culture     Status: None (Preliminary resul

## 2021-11-26 NOTE — PLAN OF CARE
Pt  is AOx4, VS are stable,on RA, voiding freely, Denies n/t. Reports weakness to her left leg/thigh. Pt c/o mild pain relieved by PO pain meds. has the PICC line to PREM, On IV Rocephin q12h. MIN drain to bulb suction with minimal output.  Up with min a

## 2021-11-27 NOTE — PROGRESS NOTES
[facility]                INFECTIOUS DISEASE PROGRESS NOTE    Ashlee Carnes Patient Status:  Inpatient    9/3/1960 MRN [id number]   [facility] 3SW-A Attending Carole Casey DO   Hosp Day # 4 PCP Sushila Herrmann MD     Antibiotic Range    Anaerobic Culture No Anaerobes to date N/A   2. Aerobic Bacterial Culture     Status: None    Collection Time: 11/24/21 11:30 AM    Specimen: Back;  Other   Result Value Ref Range    Aerobic Culture Result No Growth 3 Days N/A    Aerobic Smear 1+ W

## 2021-11-27 NOTE — PROGRESS NOTES
[facility] Hospitalist Progress Note     Irven Ormond Patient Status:  Inpatient    9/3/1960 MRN [id number]   [facility] 3SW-A Attending Joy Barrett, DO   Hosp Day # 4 PCP Shawna Macias MD     Subjective the last 168 hours. Cardiac  No results for input(s): TROP, PBNP in the last 168 hours. Creatinine Kinase  No results for input(s): CK in the last 168 hours.     Inflammatory Markers  Recent Labs   Lab 11/22/21  0703   CRP 12.25*       Imaging: Imagin

## 2021-11-27 NOTE — PLAN OF CARE
A/O VSS on room air. Taking norco for pain. IV antibiotic a ordered. MIN drain 2 ml out this shift. Last irrigated yesterday at 1700. Done daily. Up ad rubi. Still with left leg/thigh weakness. Home with IV antibiotics when ready.

## 2021-11-27 NOTE — PLAN OF CARE
A&O x 4, states LLE weakness unchanged, Drain in place- minimal drainage noted, Rocephin IVPB  given q 12 hrs as scheduled, denies pain at this time, Norco ordered prn, refusing scd's & Senokot this am, up ambulating in room, safety maintained, dc planning

## 2021-11-28 RX ORDER — KETOROLAC TROMETHAMINE 15 MG/ML
15 INJECTION, SOLUTION INTRAMUSCULAR; INTRAVENOUS EVERY 6 HOURS PRN
Status: DISCONTINUED | OUTPATIENT
Start: 2021-11-28 | End: 2021-11-29

## 2021-11-28 NOTE — PROGRESS NOTES
[facility] Hospitalist Progress Note     Arnoldo Chely Patient Status:  Inpatient    9/3/1960 MRN [id number]   [facility] 3SW-A Attending Elder Astudillo,    Hosp Day # 5 PCP Nik Santos MD     Subjective Detected 11/22/2021    COVID19 Not Detected 09/30/2021       Pro-Calcitonin  No results for input(s): PCT in the last 168 hours. Cardiac  No results for input(s): TROP, PBNP in the last 168 hours.     Creatinine Kinase  No results for input(s): CK in the

## 2021-11-28 NOTE — PLAN OF CARE
Norco given for back discomfort with adequate relief. VSS, afebrile, on Rocephin IV bid. Pt ambulating independently in room, aware to call staff though when assistance needed. Plan is dc home with IV abx pending final cultures.

## 2021-11-29 ENCOUNTER — APPOINTMENT (OUTPATIENT)
Dept: CT IMAGING | Facility: HOSPITAL | Age: 61
DRG: 539 | End: 2021-11-29
Attending: RADIOLOGY
Payer: COMMERCIAL

## 2021-11-29 VITALS
HEART RATE: 87 BPM | OXYGEN SATURATION: 98 % | TEMPERATURE: 98 F | WEIGHT: 158 LBS | DIASTOLIC BLOOD PRESSURE: 67 MMHG | SYSTOLIC BLOOD PRESSURE: 125 MMHG | HEIGHT: 61 IN | BODY MASS INDEX: 29.83 KG/M2 | RESPIRATION RATE: 18 BRPM

## 2021-11-29 PROCEDURE — 49424 ASSESS CYST CONTRAST INJECT: CPT | Performed by: RADIOLOGY

## 2021-11-29 PROCEDURE — 76080 X-RAY EXAM OF FISTULA: CPT | Performed by: RADIOLOGY

## 2021-11-29 RX ORDER — HYDROCODONE BITARTRATE AND ACETAMINOPHEN 5; 325 MG/1; MG/1
2 TABLET ORAL EVERY 6 HOURS PRN
Qty: 30 TABLET | Refills: 0 | Status: SHIPPED | OUTPATIENT
Start: 2021-11-29 | End: 2021-12-07

## 2021-11-29 RX ORDER — NAPROXEN SODIUM 550 MG/1
550 TABLET ORAL 2 TIMES DAILY
Qty: 60 TABLET | Refills: 1 | Status: SHIPPED | OUTPATIENT
Start: 2021-11-29 | End: 2022-01-25

## 2021-11-29 NOTE — PLAN OF CARE
A&Ox 4, LLE weakness unchanged, Tramadol 15 mg given x 1 for pain, states weaning off Norco to Motrin ,declined scd's & Senokot, ambulated in halls  w/o difficulty, drain in place w/ no drainage, flushed daily, updated on plan of care.

## 2021-11-29 NOTE — PLAN OF CARE
Alert and oriented,V/S stable,Rt. UPPER ARM PICC  CLEAN AND DRY,flushes well,in use for IV antibiotic. Denies pain or discomfort. Medicated as needed. Safety measures reinforced,call light kept within reach,MIN drain to suction ,dressing clean  and dry. d/c PLAN

## 2021-11-29 NOTE — DIETARY NOTE
[address] admitted on 11/22 presents with paraspinal abscess.     PMH: Compression fracture of L4 vertebra with routine healing, Decreased vision of left eye, Diverticulosis, Fibrocystic breast changes of both br

## 2021-11-29 NOTE — CM/SW NOTE
Spoke with Dr Mimi Rubinstein who stated pt with concerns about DC plan for IV abx. Met with pt and pt's friend, Keven Otoole who remained at bedside with pt's consent. Discussed DC Planning for IV abx and teach and train with [facility].   Reviewed that pt would go to [facility]

## 2021-11-29 NOTE — PAYOR COMM NOTE
--------------  CONTINUED STAY REVIEW    Payor: JOSE JAMES/BENJAMIN  Subscriber #:  [id number]  Authorization Number: [id number]    Admit date: 11/23/21  Admit time:  6:28 AM    FAXING CLINICAL UPDATE FOR 11/27/21- 11/28/21 11/27/21  Subjective:   Sancho Crawford (37.3 °C)] 99.1 °F (37.3 °C)  Pulse:  [72-87] 87  Resp:  [16-18] 18  BP: (109-129)/(54-67) 129/63     Physical Exam:    HEENT:  EOMI, PERRLA, OP clear  Respiratory: Clear to auscultation bilaterally. No wheezes. No rhonchi.   Cardiovascular: S1, S2. Regular Intravenous     11/28/2021 1714 Given 15 mg Intravenous

## 2021-11-29 NOTE — PLAN OF CARE
Pt to CT at 1310 for abscessogram.  Drain removed. Returned to room at 1400. Dressing dry and intact to drain site. Pt scheduled for IV antibiotic teaching at Dr. Shine John office tomorrow. RUE PICC flushed and patent.   Seen by Dr. Johnny Brown this am.  OK for

## 2021-12-02 NOTE — PROGRESS NOTES
Results reviewed. Released to [address] Tests show no significant abnormalities. Please let pt know her fungal culture from the hospital was negative.  JOLENEis

## 2021-12-03 NOTE — PAYOR COMM NOTE
--------------  DISCHARGE REVIEW    Payor: Mona JAMES/BENJAMIN  Subscriber #:  [id number]  Authorization Number: [id number]    Admit date: 11/23/21  Admit time:   6:28 AM  Discharge Date: 11/29/2021  3:40 PM     Admitting Physician: MD LOU Mcelroy

## 2021-12-08 ENCOUNTER — PATIENT OUTREACH (OUTPATIENT)
Dept: INFECTIOUS DISEASE | Facility: CLINIC | Age: 61
End: 2021-12-08

## 2021-12-08 DIAGNOSIS — M46.20 PARASPINAL ABSCESS (HCC): Primary | ICD-10-CM

## 2021-12-08 NOTE — PROGRESS NOTES
CRP elevated, trending down, repeat MRI to be scheduled prior to completing 6 weeks ivabx, FU spine surgeon

## 2021-12-15 ENCOUNTER — PATIENT OUTREACH (OUTPATIENT)
Dept: INFECTIOUS DISEASE | Facility: CLINIC | Age: 61
End: 2021-12-15

## 2021-12-16 ENCOUNTER — HOSPITAL ENCOUNTER (OUTPATIENT)
Dept: MRI IMAGING | Age: 61
Discharge: HOME | End: 2021-12-16
Attending: INTERNAL MEDICINE
Payer: COMMERCIAL

## 2021-12-16 ENCOUNTER — TELEPHONE (OUTPATIENT)
Dept: INFECTIOUS DISEASE | Facility: CLINIC | Age: 61
End: 2021-12-16

## 2021-12-16 DIAGNOSIS — M46.20 PARASPINAL ABSCESS (HCC): ICD-10-CM

## 2021-12-16 PROCEDURE — A9575 INJ GADOTERATE MEGLUMI 0.1ML: HCPCS | Performed by: INTERNAL MEDICINE

## 2021-12-16 PROCEDURE — 72158 MRI LUMBAR SPINE W/O & W/DYE: CPT | Performed by: INTERNAL MEDICINE

## 2021-12-17 NOTE — TELEPHONE ENCOUNTER
Spoke with patient on 12/16 regarding MRI results. She has appointment today with a neurosurgeon Dr. Kennedy Davies and also seeing Dr. Aston Laboy. She reports leg weakness. Advised to go to ED for evaluation.

## 2022-01-13 PROBLEM — M46.20 VERTEBRAL OSTEOMYELITIS (HCC): Status: ACTIVE | Noted: 2022-01-13

## 2022-01-13 RX ORDER — SODIUM CHLORIDE 0.9 % (FLUSH) 0.9 %
10 SYRINGE (ML) INJECTION ONCE
Status: CANCELLED | OUTPATIENT
Start: 2022-01-17

## 2022-01-17 ENCOUNTER — NURSE ONLY (OUTPATIENT)
Dept: HEMATOLOGY/ONCOLOGY | Facility: HOSPITAL | Age: 62
End: 2022-01-17
Attending: INTERNAL MEDICINE
Payer: COMMERCIAL

## 2022-01-17 DIAGNOSIS — M46.20 VERTEBRAL OSTEOMYELITIS (HCC): Primary | ICD-10-CM

## 2022-01-17 LAB
ALBUMIN SERPL-MCNC: 3.6 G/DL (ref 3.4–5)
ALBUMIN/GLOB SERPL: 0.9 {RATIO} (ref 1–2)
ALP LIVER SERPL-CCNC: 94 U/L
ALT SERPL-CCNC: 29 U/L
ANION GAP SERPL CALC-SCNC: 6 MMOL/L (ref 0–18)
AST SERPL-CCNC: 20 U/L (ref 15–37)
BASOPHILS # BLD AUTO: 0.04 X10(3) UL (ref 0–0.2)
BASOPHILS NFR BLD AUTO: 0.5 %
BILIRUB SERPL-MCNC: 0.3 MG/DL (ref 0.1–2)
BUN BLD-MCNC: 17 MG/DL (ref 7–18)
CALCIUM BLD-MCNC: 9.4 MG/DL (ref 8.5–10.1)
CHLORIDE SERPL-SCNC: 106 MMOL/L (ref 98–112)
CK SERPL-CCNC: 86 U/L
CO2 SERPL-SCNC: 26 MMOL/L (ref 21–32)
CREAT BLD-MCNC: 0.72 MG/DL
CRP SERPL-MCNC: 3.16 MG/DL (ref ?–0.3)
EOSINOPHIL # BLD AUTO: 0.21 X10(3) UL (ref 0–0.7)
EOSINOPHIL NFR BLD AUTO: 2.8 %
ERYTHROCYTE [DISTWIDTH] IN BLOOD BY AUTOMATED COUNT: 14.5 %
FASTING STATUS PATIENT QL REPORTED: NO
GLOBULIN PLAS-MCNC: 3.8 G/DL (ref 2.8–4.4)
GLUCOSE BLD-MCNC: 84 MG/DL (ref 70–99)
HCT VFR BLD AUTO: 37 %
HGB BLD-MCNC: 11.8 G/DL
IMM GRANULOCYTES # BLD AUTO: 0.02 X10(3) UL (ref 0–1)
IMM GRANULOCYTES NFR BLD: 0.3 %
LYMPHOCYTES # BLD AUTO: 1.88 X10(3) UL (ref 1–4)
LYMPHOCYTES NFR BLD AUTO: 24.8 %
MCH RBC QN AUTO: 27.3 PG (ref 26–34)
MCHC RBC AUTO-ENTMCNC: 31.9 G/DL (ref 31–37)
MCV RBC AUTO: 85.6 FL
MONOCYTES # BLD AUTO: 0.62 X10(3) UL (ref 0.1–1)
MONOCYTES NFR BLD AUTO: 8.2 %
NEUTROPHILS # BLD AUTO: 4.8 X10 (3) UL (ref 1.5–7.7)
NEUTROPHILS # BLD AUTO: 4.8 X10(3) UL (ref 1.5–7.7)
NEUTROPHILS NFR BLD AUTO: 63.4 %
OSMOLALITY SERPL CALC.SUM OF ELEC: 287 MOSM/KG (ref 275–295)
PLATELET # BLD AUTO: 204 10(3)UL (ref 150–450)
POTASSIUM SERPL-SCNC: 4 MMOL/L (ref 3.5–5.1)
PROT SERPL-MCNC: 7.4 G/DL (ref 6.4–8.2)
RBC # BLD AUTO: 4.32 X10(6)UL
SODIUM SERPL-SCNC: 138 MMOL/L (ref 136–145)
WBC # BLD AUTO: 7.6 X10(3) UL (ref 4–11)

## 2022-01-17 PROCEDURE — 80053 COMPREHEN METABOLIC PANEL: CPT

## 2022-01-17 PROCEDURE — 82550 ASSAY OF CK (CPK): CPT

## 2022-01-17 PROCEDURE — 86140 C-REACTIVE PROTEIN: CPT

## 2022-01-17 PROCEDURE — 85025 COMPLETE CBC W/AUTO DIFF WBC: CPT

## 2022-01-17 PROCEDURE — 36592 COLLECT BLOOD FROM PICC: CPT

## 2022-01-17 RX ORDER — SODIUM CHLORIDE 0.9 % (FLUSH) 0.9 %
10 SYRINGE (ML) INJECTION ONCE
Status: CANCELLED | OUTPATIENT
Start: 2022-01-24

## 2022-01-18 NOTE — DISCHARGE SUMMARY
General Medicine Discharge Summary     Patient ID:  Breana Nickerson  59year old  9/3/1960    Admit date: 11/22/2021    Discharge date and time: 11/29/2021    Attending Physician: No att. providers found phlegmon     Discitis and osteo L3-L4 and phlegmon on mri back 11/22  - ID following, plan discussed   -NSGY following, no acute neurosurgical intervention   -s/p IR placement of paraspinal abscess drain   -iv abx w cefriaxone pending final drain cultures Pulse: 87   Resp: 18   Temp: 97.7 °F (36.5 °C)       General: No acute distress. Comfortable, nontoxic   HEENT:  EOMI, PERRLA, OP clear  Respiratory: Clear to auscultation bilaterally. No wheezes. No rhonchi.   Cardiovascular: S1, S2. Regular rate and rhy

## 2022-01-24 ENCOUNTER — NURSE ONLY (OUTPATIENT)
Dept: HEMATOLOGY/ONCOLOGY | Facility: HOSPITAL | Age: 62
End: 2022-01-24
Attending: INTERNAL MEDICINE
Payer: COMMERCIAL

## 2022-01-24 DIAGNOSIS — M46.20 VERTEBRAL OSTEOMYELITIS (HCC): Primary | ICD-10-CM

## 2022-01-24 LAB
ALBUMIN SERPL-MCNC: 3.5 G/DL (ref 3.4–5)
ALBUMIN/GLOB SERPL: 0.8 {RATIO} (ref 1–2)
ALP LIVER SERPL-CCNC: 111 U/L
ALT SERPL-CCNC: 41 U/L
ANION GAP SERPL CALC-SCNC: 4 MMOL/L (ref 0–18)
AST SERPL-CCNC: 29 U/L (ref 15–37)
BASOPHILS # BLD AUTO: 0.04 X10(3) UL (ref 0–0.2)
BASOPHILS NFR BLD AUTO: 0.5 %
BILIRUB SERPL-MCNC: 0.2 MG/DL (ref 0.1–2)
BUN BLD-MCNC: 25 MG/DL (ref 7–18)
CALCIUM BLD-MCNC: 9.5 MG/DL (ref 8.5–10.1)
CHLORIDE SERPL-SCNC: 110 MMOL/L (ref 98–112)
CK SERPL-CCNC: 68 U/L
CO2 SERPL-SCNC: 25 MMOL/L (ref 21–32)
CREAT BLD-MCNC: 0.69 MG/DL
CRP SERPL-MCNC: 4.08 MG/DL (ref ?–0.3)
EOSINOPHIL # BLD AUTO: 0.2 X10(3) UL (ref 0–0.7)
EOSINOPHIL NFR BLD AUTO: 2.3 %
ERYTHROCYTE [DISTWIDTH] IN BLOOD BY AUTOMATED COUNT: 14 %
GLOBULIN PLAS-MCNC: 4.3 G/DL (ref 2.8–4.4)
GLUCOSE BLD-MCNC: 85 MG/DL (ref 70–99)
HCT VFR BLD AUTO: 37.8 %
HGB BLD-MCNC: 11.8 G/DL
IMM GRANULOCYTES # BLD AUTO: 0.02 X10(3) UL (ref 0–1)
IMM GRANULOCYTES NFR BLD: 0.2 %
LYMPHOCYTES # BLD AUTO: 1.94 X10(3) UL (ref 1–4)
LYMPHOCYTES NFR BLD AUTO: 22.7 %
MCH RBC QN AUTO: 26.6 PG (ref 26–34)
MCHC RBC AUTO-ENTMCNC: 31.2 G/DL (ref 31–37)
MCV RBC AUTO: 85.3 FL
MONOCYTES # BLD AUTO: 0.58 X10(3) UL (ref 0.1–1)
MONOCYTES NFR BLD AUTO: 6.8 %
NEUTROPHILS # BLD AUTO: 5.78 X10 (3) UL (ref 1.5–7.7)
NEUTROPHILS # BLD AUTO: 5.78 X10(3) UL (ref 1.5–7.7)
NEUTROPHILS NFR BLD AUTO: 67.5 %
OSMOLALITY SERPL CALC.SUM OF ELEC: 292 MOSM/KG (ref 275–295)
PLATELET # BLD AUTO: 265 10(3)UL (ref 150–450)
POTASSIUM SERPL-SCNC: 3.9 MMOL/L (ref 3.5–5.1)
PROT SERPL-MCNC: 7.8 G/DL (ref 6.4–8.2)
RBC # BLD AUTO: 4.43 X10(6)UL
SODIUM SERPL-SCNC: 139 MMOL/L (ref 136–145)
WBC # BLD AUTO: 8.6 X10(3) UL (ref 4–11)

## 2022-01-24 PROCEDURE — 36592 COLLECT BLOOD FROM PICC: CPT

## 2022-01-24 PROCEDURE — 80053 COMPREHEN METABOLIC PANEL: CPT

## 2022-01-24 PROCEDURE — 82550 ASSAY OF CK (CPK): CPT

## 2022-01-24 PROCEDURE — 85025 COMPLETE CBC W/AUTO DIFF WBC: CPT

## 2022-01-24 PROCEDURE — 86140 C-REACTIVE PROTEIN: CPT

## 2022-01-24 RX ORDER — SODIUM CHLORIDE 0.9 % (FLUSH) 0.9 %
10 SYRINGE (ML) INJECTION ONCE
Status: CANCELLED | OUTPATIENT
Start: 2022-01-31

## 2022-01-31 ENCOUNTER — NURSE ONLY (OUTPATIENT)
Dept: HEMATOLOGY/ONCOLOGY | Facility: HOSPITAL | Age: 62
End: 2022-01-31
Attending: INTERNAL MEDICINE
Payer: COMMERCIAL

## 2022-01-31 DIAGNOSIS — M46.20 VERTEBRAL OSTEOMYELITIS (HCC): Primary | ICD-10-CM

## 2022-01-31 LAB
ALBUMIN SERPL-MCNC: 3.2 G/DL (ref 3.4–5)
ALBUMIN/GLOB SERPL: 0.8 {RATIO} (ref 1–2)
ALP LIVER SERPL-CCNC: 111 U/L
ALT SERPL-CCNC: 33 U/L
ANION GAP SERPL CALC-SCNC: 4 MMOL/L (ref 0–18)
AST SERPL-CCNC: 20 U/L (ref 15–37)
BASOPHILS # BLD AUTO: 0.06 X10(3) UL (ref 0–0.2)
BASOPHILS NFR BLD AUTO: 0.6 %
BILIRUB SERPL-MCNC: 0.4 MG/DL (ref 0.1–2)
BUN BLD-MCNC: 27 MG/DL (ref 7–18)
CALCIUM BLD-MCNC: 8.5 MG/DL (ref 8.5–10.1)
CHLORIDE SERPL-SCNC: 107 MMOL/L (ref 98–112)
CK SERPL-CCNC: 110 U/L
CO2 SERPL-SCNC: 26 MMOL/L (ref 21–32)
CREAT BLD-MCNC: 0.81 MG/DL
CRP SERPL-MCNC: 5.41 MG/DL (ref ?–0.3)
EOSINOPHIL # BLD AUTO: 0.46 X10(3) UL (ref 0–0.7)
EOSINOPHIL NFR BLD AUTO: 4.2 %
ERYTHROCYTE [DISTWIDTH] IN BLOOD BY AUTOMATED COUNT: 13.7 %
FASTING STATUS PATIENT QL REPORTED: NO
GLOBULIN PLAS-MCNC: 4 G/DL (ref 2.8–4.4)
GLUCOSE BLD-MCNC: 156 MG/DL (ref 70–99)
HCT VFR BLD AUTO: 36.7 %
HGB BLD-MCNC: 11.4 G/DL
IMM GRANULOCYTES # BLD AUTO: 0.04 X10(3) UL (ref 0–1)
IMM GRANULOCYTES NFR BLD: 0.4 %
LYMPHOCYTES # BLD AUTO: 1.35 X10(3) UL (ref 1–4)
LYMPHOCYTES NFR BLD AUTO: 12.4 %
MCH RBC QN AUTO: 27 PG (ref 26–34)
MCHC RBC AUTO-ENTMCNC: 31.1 G/DL (ref 31–37)
MCV RBC AUTO: 86.8 FL
MONOCYTES # BLD AUTO: 0.41 X10(3) UL (ref 0.1–1)
MONOCYTES NFR BLD AUTO: 3.8 %
NEUTROPHILS # BLD AUTO: 8.54 X10 (3) UL (ref 1.5–7.7)
NEUTROPHILS # BLD AUTO: 8.54 X10(3) UL (ref 1.5–7.7)
NEUTROPHILS NFR BLD AUTO: 78.6 %
OSMOLALITY SERPL CALC.SUM OF ELEC: 292 MOSM/KG (ref 275–295)
PLATELET # BLD AUTO: 236 10(3)UL (ref 150–450)
POTASSIUM SERPL-SCNC: 3.6 MMOL/L (ref 3.5–5.1)
PROT SERPL-MCNC: 7.2 G/DL (ref 6.4–8.2)
RBC # BLD AUTO: 4.23 X10(6)UL
SODIUM SERPL-SCNC: 137 MMOL/L (ref 136–145)
WBC # BLD AUTO: 10.9 X10(3) UL (ref 4–11)

## 2022-01-31 PROCEDURE — 85025 COMPLETE CBC W/AUTO DIFF WBC: CPT

## 2022-01-31 PROCEDURE — 36592 COLLECT BLOOD FROM PICC: CPT

## 2022-01-31 PROCEDURE — 82550 ASSAY OF CK (CPK): CPT

## 2022-01-31 PROCEDURE — 86140 C-REACTIVE PROTEIN: CPT

## 2022-01-31 PROCEDURE — 80053 COMPREHEN METABOLIC PANEL: CPT

## 2022-01-31 RX ORDER — SODIUM CHLORIDE 0.9 % (FLUSH) 0.9 %
10 SYRINGE (ML) INJECTION ONCE
Status: CANCELLED | OUTPATIENT
Start: 2022-02-07

## 2022-01-31 NOTE — PROGRESS NOTES
Education Record    Learner:  Patient    Disease / Diagnosis: Right PICC line dressing change with labs    Barriers / Limitations:  None   Comments:    Method:  Brief focused and Reinforcement   Comments:    General Topics:  Plan of care reviewed   Comment

## 2022-02-07 ENCOUNTER — NURSE ONLY (OUTPATIENT)
Dept: HEMATOLOGY/ONCOLOGY | Facility: HOSPITAL | Age: 62
End: 2022-02-07
Attending: INTERNAL MEDICINE
Payer: COMMERCIAL

## 2022-02-07 DIAGNOSIS — M46.20 VERTEBRAL OSTEOMYELITIS (HCC): Primary | ICD-10-CM

## 2022-02-07 LAB
ALBUMIN SERPL-MCNC: 3.2 G/DL (ref 3.4–5)
ALBUMIN/GLOB SERPL: 0.7 {RATIO} (ref 1–2)
ALP LIVER SERPL-CCNC: 104 U/L
ALT SERPL-CCNC: 28 U/L
ANION GAP SERPL CALC-SCNC: 6 MMOL/L (ref 0–18)
AST SERPL-CCNC: 19 U/L (ref 15–37)
BASOPHILS # BLD AUTO: 0.09 X10(3) UL (ref 0–0.2)
BASOPHILS NFR BLD AUTO: 1.1 %
BILIRUB SERPL-MCNC: 0.2 MG/DL (ref 0.1–2)
BUN BLD-MCNC: 22 MG/DL (ref 7–18)
CALCIUM BLD-MCNC: 8.6 MG/DL (ref 8.5–10.1)
CHLORIDE SERPL-SCNC: 109 MMOL/L (ref 98–112)
CK SERPL-CCNC: 75 U/L
CO2 SERPL-SCNC: 25 MMOL/L (ref 21–32)
CREAT BLD-MCNC: 0.87 MG/DL
CRP SERPL-MCNC: 5.73 MG/DL (ref ?–0.3)
EOSINOPHIL # BLD AUTO: 0.48 X10(3) UL (ref 0–0.7)
EOSINOPHIL NFR BLD AUTO: 5.6 %
ERYTHROCYTE [DISTWIDTH] IN BLOOD BY AUTOMATED COUNT: 13.8 %
FASTING STATUS PATIENT QL REPORTED: NO
GLOBULIN PLAS-MCNC: 4.4 G/DL (ref 2.8–4.4)
GLUCOSE BLD-MCNC: 82 MG/DL (ref 70–99)
HCT VFR BLD AUTO: 36.1 %
HGB BLD-MCNC: 11.1 G/DL
IMM GRANULOCYTES # BLD AUTO: 0.03 X10(3) UL (ref 0–1)
IMM GRANULOCYTES NFR BLD: 0.4 %
LYMPHOCYTES # BLD AUTO: 1.69 X10(3) UL (ref 1–4)
LYMPHOCYTES NFR BLD AUTO: 19.8 %
MCH RBC QN AUTO: 26.3 PG (ref 26–34)
MCHC RBC AUTO-ENTMCNC: 30.7 G/DL (ref 31–37)
MCV RBC AUTO: 85.5 FL
MONOCYTES # BLD AUTO: 0.57 X10(3) UL (ref 0.1–1)
MONOCYTES NFR BLD AUTO: 6.7 %
NEUTROPHILS # BLD AUTO: 5.69 X10 (3) UL (ref 1.5–7.7)
NEUTROPHILS # BLD AUTO: 5.69 X10(3) UL (ref 1.5–7.7)
NEUTROPHILS NFR BLD AUTO: 66.4 %
OSMOLALITY SERPL CALC.SUM OF ELEC: 292 MOSM/KG (ref 275–295)
PLATELET # BLD AUTO: 258 10(3)UL (ref 150–450)
POTASSIUM SERPL-SCNC: 4.2 MMOL/L (ref 3.5–5.1)
PROT SERPL-MCNC: 7.6 G/DL (ref 6.4–8.2)
RBC # BLD AUTO: 4.22 X10(6)UL
SODIUM SERPL-SCNC: 140 MMOL/L (ref 136–145)
WBC # BLD AUTO: 8.6 X10(3) UL (ref 4–11)

## 2022-02-07 PROCEDURE — 85025 COMPLETE CBC W/AUTO DIFF WBC: CPT

## 2022-02-07 PROCEDURE — 80053 COMPREHEN METABOLIC PANEL: CPT

## 2022-02-07 PROCEDURE — 36592 COLLECT BLOOD FROM PICC: CPT

## 2022-02-07 PROCEDURE — 86140 C-REACTIVE PROTEIN: CPT

## 2022-02-07 PROCEDURE — 82550 ASSAY OF CK (CPK): CPT

## 2022-02-07 RX ORDER — SODIUM CHLORIDE 0.9 % (FLUSH) 0.9 %
10 SYRINGE (ML) INJECTION ONCE
Status: CANCELLED | OUTPATIENT
Start: 2022-02-14

## 2022-02-14 ENCOUNTER — NURSE ONLY (OUTPATIENT)
Dept: HEMATOLOGY/ONCOLOGY | Facility: HOSPITAL | Age: 62
End: 2022-02-14
Attending: INTERNAL MEDICINE
Payer: COMMERCIAL

## 2022-02-14 DIAGNOSIS — M46.20 VERTEBRAL OSTEOMYELITIS (HCC): Primary | ICD-10-CM

## 2022-02-14 LAB
ALBUMIN SERPL-MCNC: 3.4 G/DL (ref 3.4–5)
ALBUMIN/GLOB SERPL: 0.8 {RATIO} (ref 1–2)
ALP LIVER SERPL-CCNC: 102 U/L
ALT SERPL-CCNC: 30 U/L
ANION GAP SERPL CALC-SCNC: 2 MMOL/L (ref 0–18)
AST SERPL-CCNC: 18 U/L (ref 15–37)
BASOPHILS # BLD AUTO: 0.05 X10(3) UL (ref 0–0.2)
BASOPHILS NFR BLD AUTO: 0.5 %
BILIRUB SERPL-MCNC: 0.3 MG/DL (ref 0.1–2)
BUN BLD-MCNC: 23 MG/DL (ref 7–18)
CALCIUM BLD-MCNC: 8.7 MG/DL (ref 8.5–10.1)
CHLORIDE SERPL-SCNC: 108 MMOL/L (ref 98–112)
CK SERPL-CCNC: 58 U/L
CO2 SERPL-SCNC: 28 MMOL/L (ref 21–32)
CREAT BLD-MCNC: 0.62 MG/DL
CRP SERPL-MCNC: 4.42 MG/DL (ref ?–0.3)
EOSINOPHIL # BLD AUTO: 0.22 X10(3) UL (ref 0–0.7)
EOSINOPHIL NFR BLD AUTO: 2.3 %
ERYTHROCYTE [DISTWIDTH] IN BLOOD BY AUTOMATED COUNT: 13.7 %
FASTING STATUS PATIENT QL REPORTED: NO
GLOBULIN PLAS-MCNC: 4.1 G/DL (ref 2.8–4.4)
GLUCOSE BLD-MCNC: 67 MG/DL (ref 70–99)
HCT VFR BLD AUTO: 34.9 %
HGB BLD-MCNC: 11.4 G/DL
IMM GRANULOCYTES # BLD AUTO: 0.03 X10(3) UL (ref 0–1)
IMM GRANULOCYTES NFR BLD: 0.3 %
LYMPHOCYTES # BLD AUTO: 1.59 X10(3) UL (ref 1–4)
LYMPHOCYTES NFR BLD AUTO: 16.9 %
MCH RBC QN AUTO: 27.3 PG (ref 26–34)
MCHC RBC AUTO-ENTMCNC: 32.7 G/DL (ref 31–37)
MCV RBC AUTO: 83.5 FL
MONOCYTES # BLD AUTO: 0.65 X10(3) UL (ref 0.1–1)
MONOCYTES NFR BLD AUTO: 6.9 %
NEUTROPHILS # BLD AUTO: 6.86 X10 (3) UL (ref 1.5–7.7)
NEUTROPHILS # BLD AUTO: 6.86 X10(3) UL (ref 1.5–7.7)
NEUTROPHILS NFR BLD AUTO: 73.1 %
OSMOLALITY SERPL CALC.SUM OF ELEC: 288 MOSM/KG (ref 275–295)
PLATELET # BLD AUTO: 237 10(3)UL (ref 150–450)
POTASSIUM SERPL-SCNC: 4 MMOL/L (ref 3.5–5.1)
PROT SERPL-MCNC: 7.5 G/DL (ref 6.4–8.2)
RBC # BLD AUTO: 4.18 X10(6)UL
SODIUM SERPL-SCNC: 138 MMOL/L (ref 136–145)
WBC # BLD AUTO: 9.4 X10(3) UL (ref 4–11)

## 2022-02-14 PROCEDURE — 80053 COMPREHEN METABOLIC PANEL: CPT

## 2022-02-14 PROCEDURE — 86140 C-REACTIVE PROTEIN: CPT

## 2022-02-14 PROCEDURE — 85025 COMPLETE CBC W/AUTO DIFF WBC: CPT

## 2022-02-14 PROCEDURE — 36592 COLLECT BLOOD FROM PICC: CPT

## 2022-02-14 PROCEDURE — 82550 ASSAY OF CK (CPK): CPT

## 2022-02-14 RX ORDER — SODIUM CHLORIDE 0.9 % (FLUSH) 0.9 %
10 SYRINGE (ML) INJECTION ONCE
Status: CANCELLED | OUTPATIENT
Start: 2022-02-21

## 2022-02-14 NOTE — PROGRESS NOTES
Patient here for labs and PICC dressing change. Labs drawn and PICC dressing changed. Patient tolerated well. Patient discharged in stable condition.

## 2022-02-21 ENCOUNTER — HOSPITAL ENCOUNTER (EMERGENCY)
Age: 62
Discharge: HOME | End: 2022-02-21
Attending: EMERGENCY MEDICINE

## 2022-02-21 ENCOUNTER — NURSE ONLY (OUTPATIENT)
Dept: HEMATOLOGY/ONCOLOGY | Facility: HOSPITAL | Age: 62
End: 2022-02-21
Attending: INTERNAL MEDICINE
Payer: COMMERCIAL

## 2022-02-21 VITALS
OXYGEN SATURATION: 97 % | SYSTOLIC BLOOD PRESSURE: 149 MMHG | HEART RATE: 82 BPM | DIASTOLIC BLOOD PRESSURE: 83 MMHG | RESPIRATION RATE: 16 BRPM | TEMPERATURE: 97.3 F | WEIGHT: 179.23 LBS

## 2022-02-21 DIAGNOSIS — M54.50 LUMBOSACRAL PAIN: Primary | ICD-10-CM

## 2022-02-21 DIAGNOSIS — M46.39: ICD-10-CM

## 2022-02-21 DIAGNOSIS — M46.20 VERTEBRAL OSTEOMYELITIS (HCC): Primary | ICD-10-CM

## 2022-02-21 LAB
ALBUMIN SERPL-MCNC: 3.3 G/DL (ref 3.4–5)
ALP LIVER SERPL-CCNC: 116 U/L
ALT SERPL-CCNC: 32 U/L
ANION GAP SERPL CALC-SCNC: 4 MMOL/L (ref 0–18)
AST SERPL-CCNC: 25 U/L (ref 15–37)
BASOPHILS # BLD AUTO: 0.05 X10(3) UL (ref 0–0.2)
BASOPHILS NFR BLD AUTO: 0.5 %
BILIRUB SERPL-MCNC: 0.3 MG/DL (ref 0.1–2)
BUN BLD-MCNC: 24 MG/DL (ref 7–18)
CALCIUM BLD-MCNC: 9.2 MG/DL (ref 8.5–10.1)
CHLORIDE SERPL-SCNC: 107 MMOL/L (ref 98–112)
CK SERPL-CCNC: 136 U/L
CO2 SERPL-SCNC: 28 MMOL/L (ref 21–32)
CREAT BLD-MCNC: 0.82 MG/DL
CRP SERPL-MCNC: 5.46 MG/DL (ref ?–0.3)
EOSINOPHIL # BLD AUTO: 0.49 X10(3) UL (ref 0–0.7)
EOSINOPHIL NFR BLD AUTO: 5.2 %
ERYTHROCYTE [DISTWIDTH] IN BLOOD BY AUTOMATED COUNT: 13.5 %
FASTING STATUS PATIENT QL REPORTED: NO
GLOBULIN PLAS-MCNC: 4.4 G/DL (ref 2.8–4.4)
GLUCOSE BLD-MCNC: 112 MG/DL (ref 70–99)
HCT VFR BLD AUTO: 35.8 %
HGB BLD-MCNC: 11.8 G/DL
IMM GRANULOCYTES # BLD AUTO: 0.03 X10(3) UL (ref 0–1)
IMM GRANULOCYTES NFR BLD: 0.3 %
LYMPHOCYTES # BLD AUTO: 1.46 X10(3) UL (ref 1–4)
LYMPHOCYTES NFR BLD AUTO: 15.4 %
MCH RBC QN AUTO: 27 PG (ref 26–34)
MCHC RBC AUTO-ENTMCNC: 33 G/DL (ref 31–37)
MCV RBC AUTO: 81.9 FL
MONOCYTES # BLD AUTO: 0.5 X10(3) UL (ref 0.1–1)
MONOCYTES NFR BLD AUTO: 5.3 %
NEUTROPHILS # BLD AUTO: 6.94 X10 (3) UL (ref 1.5–7.7)
NEUTROPHILS # BLD AUTO: 6.94 X10(3) UL (ref 1.5–7.7)
NEUTROPHILS NFR BLD AUTO: 73.3 %
OSMOLALITY SERPL CALC.SUM OF ELEC: 293 MOSM/KG (ref 275–295)
PLATELET # BLD AUTO: 260 10(3)UL (ref 150–450)
POTASSIUM SERPL-SCNC: 4.3 MMOL/L (ref 3.5–5.1)
PROT SERPL-MCNC: 7.7 G/DL (ref 6.4–8.2)
RBC # BLD AUTO: 4.37 X10(6)UL
SODIUM SERPL-SCNC: 139 MMOL/L (ref 136–145)
WBC # BLD AUTO: 9.5 X10(3) UL (ref 4–11)

## 2022-02-21 PROCEDURE — 36592 COLLECT BLOOD FROM PICC: CPT

## 2022-02-21 PROCEDURE — 85025 COMPLETE CBC W/AUTO DIFF WBC: CPT

## 2022-02-21 PROCEDURE — 82550 ASSAY OF CK (CPK): CPT

## 2022-02-21 PROCEDURE — 86140 C-REACTIVE PROTEIN: CPT

## 2022-02-21 PROCEDURE — 80053 COMPREHEN METABOLIC PANEL: CPT

## 2022-02-21 PROCEDURE — 99282 EMERGENCY DEPT VISIT SF MDM: CPT

## 2022-02-21 RX ORDER — HYDROCODONE BITARTRATE AND ACETAMINOPHEN 10; 325 MG/1; MG/1
1 TABLET ORAL EVERY 8 HOURS PRN
Qty: 4 TABLET | Refills: 0 | Status: SHIPPED | OUTPATIENT
Start: 2022-02-21

## 2022-02-21 RX ORDER — SODIUM CHLORIDE 0.9 % (FLUSH) 0.9 %
10 SYRINGE (ML) INJECTION ONCE
Status: CANCELLED | OUTPATIENT
Start: 2022-02-28

## 2022-02-21 ASSESSMENT — ENCOUNTER SYMPTOMS
RESPIRATORY NEGATIVE: 1
GASTROINTESTINAL NEGATIVE: 1
NEUROLOGICAL NEGATIVE: 1
BACK PAIN: 1

## 2022-02-21 NOTE — PROGRESS NOTES
Patient here for labs and PICC dressing change. Labs drawn and PICC dressing changed. Patient tolerated well. Next appointment confirmed with patient. Patient discharged in stable condition.

## 2022-02-22 ENCOUNTER — OFFICE VISIT (OUTPATIENT)
Dept: HEMATOLOGY/ONCOLOGY | Facility: HOSPITAL | Age: 62
End: 2022-02-22
Attending: INTERNAL MEDICINE
Payer: COMMERCIAL

## 2022-02-22 DIAGNOSIS — M46.20 VERTEBRAL OSTEOMYELITIS (HCC): Primary | ICD-10-CM

## 2022-02-22 PROCEDURE — 96374 THER/PROPH/DIAG INJ IV PUSH: CPT

## 2022-02-22 PROCEDURE — 36593 DECLOT VASCULAR DEVICE: CPT

## 2022-02-22 RX ORDER — SODIUM CHLORIDE 0.9 % (FLUSH) 0.9 %
10 SYRINGE (ML) INJECTION ONCE
OUTPATIENT
Start: 2022-02-28

## 2022-02-22 RX ORDER — SODIUM CHLORIDE 0.9 % (FLUSH) 0.9 %
10 SYRINGE (ML) INJECTION ONCE
Status: COMPLETED | OUTPATIENT
Start: 2022-02-22 | End: 2022-02-22

## 2022-02-22 RX ADMIN — SODIUM CHLORIDE 0.9 % (FLUSH) 10 ML: 0.9 % SYRINGE (ML) INJECTION at 13:10:00

## 2022-02-22 NOTE — PROGRESS NOTES
Education Record    Learner:  Patient    Disease / Diagnosis:    Barriers / Limitations:  None   Comments:    Method:  Discussion   Comments:    General Topics:  Medication, Procedure and Plan of care reviewed   Comments:    Outcome:  Shows understanding   Comments:    Patient presented to [facility] today for assessment of sluggish PICC line and insertion of CathFlo/ Alteplase. PICC line initially sluggish with no blood return. After 45 minutes, line was flushed with saline and excellent blood return was noted. Patient discharged from [facility] in stable condition.

## 2022-02-28 ENCOUNTER — APPOINTMENT (OUTPATIENT)
Dept: HEMATOLOGY/ONCOLOGY | Facility: HOSPITAL | Age: 62
End: 2022-02-28
Attending: INTERNAL MEDICINE
Payer: COMMERCIAL

## 2022-03-11 ENCOUNTER — DOCUMENTATION ONLY (OUTPATIENT)
Dept: HEMATOLOGY/ONCOLOGY | Facility: HOSPITAL | Age: 62
End: 2022-03-11

## 2022-03-11 NOTE — PROGRESS NOTES
Received PICC line lab draw orders from [address] [place] [address]. Form faxed to Dr. Cynthia Lange office at [phone] to have MD cosign as they have done in the past. Also missing dressing change order and dx code. Retained copy of order with charge RN.

## 2022-03-18 ENCOUNTER — TELEPHONE (OUTPATIENT)
Dept: HEMATOLOGY/ONCOLOGY | Facility: HOSPITAL | Age: 62
End: 2022-03-18

## 2022-03-18 NOTE — TELEPHONE ENCOUNTER
Called Anita [address] office to clarify if she would be signing [facility] lab orders signed/dated 3/15/22. Per MD nurse, patient has decided to have her PICC care and labs done through [facility].

## 2022-04-04 ENCOUNTER — NURSE ONLY (OUTPATIENT)
Dept: HEMATOLOGY/ONCOLOGY | Facility: HOSPITAL | Age: 62
End: 2022-04-04
Attending: INTERNAL MEDICINE
Payer: COMMERCIAL

## 2022-04-04 DIAGNOSIS — M46.20 VERTEBRAL OSTEOMYELITIS (HCC): Primary | ICD-10-CM

## 2022-04-04 LAB
ALBUMIN SERPL-MCNC: 3.3 G/DL (ref 3.4–5)
ALP LIVER SERPL-CCNC: 144 U/L
ALT SERPL-CCNC: 33 U/L
AST SERPL-CCNC: 22 U/L (ref 15–37)
BASOPHILS # BLD AUTO: 0.03 X10(3) UL (ref 0–0.2)
BASOPHILS NFR BLD AUTO: 0.4 %
BILIRUB DIRECT SERPL-MCNC: <0.1 MG/DL (ref 0–0.2)
BILIRUB SERPL-MCNC: 0.2 MG/DL (ref 0.1–2)
BUN BLD-MCNC: 19 MG/DL (ref 7–18)
CREAT BLD-MCNC: 0.63 MG/DL
CRP SERPL-MCNC: 2.52 MG/DL (ref ?–0.3)
EOSINOPHIL # BLD AUTO: 0.14 X10(3) UL (ref 0–0.7)
EOSINOPHIL NFR BLD AUTO: 2 %
ERYTHROCYTE [DISTWIDTH] IN BLOOD BY AUTOMATED COUNT: 15.2 %
ERYTHROCYTE [SEDIMENTATION RATE] IN BLOOD: 36 MM/HR
HCT VFR BLD AUTO: 33.5 %
HGB BLD-MCNC: 10.5 G/DL
IMM GRANULOCYTES # BLD AUTO: 0.01 X10(3) UL (ref 0–1)
IMM GRANULOCYTES NFR BLD: 0.1 %
LYMPHOCYTES # BLD AUTO: 1.15 X10(3) UL (ref 1–4)
LYMPHOCYTES NFR BLD AUTO: 16.6 %
MCH RBC QN AUTO: 26.4 PG (ref 26–34)
MCHC RBC AUTO-ENTMCNC: 31.3 G/DL (ref 31–37)
MCV RBC AUTO: 84.4 FL
MONOCYTES # BLD AUTO: 0.55 X10(3) UL (ref 0.1–1)
MONOCYTES NFR BLD AUTO: 7.9 %
NEUTROPHILS # BLD AUTO: 5.04 X10 (3) UL (ref 1.5–7.7)
NEUTROPHILS # BLD AUTO: 5.04 X10(3) UL (ref 1.5–7.7)
NEUTROPHILS NFR BLD AUTO: 73 %
PLATELET # BLD AUTO: 287 10(3)UL (ref 150–450)
PROT SERPL-MCNC: 7.4 G/DL (ref 6.4–8.2)
RBC # BLD AUTO: 3.97 X10(6)UL
WBC # BLD AUTO: 6.9 X10(3) UL (ref 4–11)

## 2022-04-04 PROCEDURE — 86140 C-REACTIVE PROTEIN: CPT

## 2022-04-04 PROCEDURE — 82565 ASSAY OF CREATININE: CPT

## 2022-04-04 PROCEDURE — 85025 COMPLETE CBC W/AUTO DIFF WBC: CPT

## 2022-04-04 PROCEDURE — 36592 COLLECT BLOOD FROM PICC: CPT

## 2022-04-04 PROCEDURE — 84520 ASSAY OF UREA NITROGEN: CPT

## 2022-04-04 PROCEDURE — 85652 RBC SED RATE AUTOMATED: CPT

## 2022-04-04 PROCEDURE — 80076 HEPATIC FUNCTION PANEL: CPT

## 2022-04-04 RX ORDER — SODIUM CHLORIDE 0.9 % (FLUSH) 0.9 %
10 SYRINGE (ML) INJECTION ONCE
OUTPATIENT
Start: 2022-04-11

## 2022-04-11 ENCOUNTER — NURSE ONLY (OUTPATIENT)
Dept: HEMATOLOGY/ONCOLOGY | Facility: HOSPITAL | Age: 62
End: 2022-04-11
Attending: INTERNAL MEDICINE
Payer: COMMERCIAL

## 2022-04-11 DIAGNOSIS — M88.9 PAGET'S DISEASE OF BONE: ICD-10-CM

## 2022-04-11 DIAGNOSIS — M46.20 VERTEBRAL OSTEOMYELITIS (HCC): ICD-10-CM

## 2022-04-11 LAB
ALBUMIN SERPL-MCNC: 3.3 G/DL (ref 3.4–5)
ALBUMIN SERPL-MCNC: 3.3 G/DL (ref 3.4–5)
ALBUMIN/GLOB SERPL: 0.8 {RATIO} (ref 1–2)
ALP LIVER SERPL-CCNC: 127 U/L
ALP LIVER SERPL-CCNC: 128 U/L
ALT SERPL-CCNC: 31 U/L
ALT SERPL-CCNC: 31 U/L
ANION GAP SERPL CALC-SCNC: 5 MMOL/L (ref 0–18)
AST SERPL-CCNC: 22 U/L (ref 15–37)
AST SERPL-CCNC: 25 U/L (ref 15–37)
BASOPHILS # BLD AUTO: 0.04 X10(3) UL (ref 0–0.2)
BASOPHILS NFR BLD AUTO: 0.6 %
BILIRUB DIRECT SERPL-MCNC: <0.1 MG/DL (ref 0–0.2)
BILIRUB SERPL-MCNC: 0.2 MG/DL (ref 0.1–2)
BILIRUB SERPL-MCNC: 0.2 MG/DL (ref 0.1–2)
BUN BLD-MCNC: 16 MG/DL (ref 7–18)
BUN BLD-MCNC: 16 MG/DL (ref 7–18)
CALCIUM BLD-MCNC: 8.8 MG/DL (ref 8.5–10.1)
CHLORIDE SERPL-SCNC: 112 MMOL/L (ref 98–112)
CO2 SERPL-SCNC: 25 MMOL/L (ref 21–32)
CREAT BLD-MCNC: 0.66 MG/DL
CREAT BLD-MCNC: 0.66 MG/DL
CRP SERPL-MCNC: 1.8 MG/DL (ref ?–0.3)
EOSINOPHIL # BLD AUTO: 0.15 X10(3) UL (ref 0–0.7)
EOSINOPHIL NFR BLD AUTO: 2.4 %
ERYTHROCYTE [DISTWIDTH] IN BLOOD BY AUTOMATED COUNT: 15 %
ERYTHROCYTE [SEDIMENTATION RATE] IN BLOOD: 32 MM/HR
FASTING STATUS PATIENT QL REPORTED: NO
GLOBULIN PLAS-MCNC: 3.9 G/DL (ref 2.8–4.4)
GLUCOSE BLD-MCNC: 104 MG/DL (ref 70–99)
HCT VFR BLD AUTO: 33.3 %
HGB BLD-MCNC: 10.4 G/DL
IMM GRANULOCYTES # BLD AUTO: 0.02 X10(3) UL (ref 0–1)
IMM GRANULOCYTES NFR BLD: 0.3 %
LYMPHOCYTES # BLD AUTO: 0.97 X10(3) UL (ref 1–4)
LYMPHOCYTES NFR BLD AUTO: 15.3 %
MCH RBC QN AUTO: 26.5 PG (ref 26–34)
MCHC RBC AUTO-ENTMCNC: 31.2 G/DL (ref 31–37)
MCV RBC AUTO: 84.7 FL
MONOCYTES # BLD AUTO: 0.5 X10(3) UL (ref 0.1–1)
MONOCYTES NFR BLD AUTO: 7.9 %
NEUTROPHILS # BLD AUTO: 4.64 X10 (3) UL (ref 1.5–7.7)
NEUTROPHILS # BLD AUTO: 4.64 X10(3) UL (ref 1.5–7.7)
NEUTROPHILS NFR BLD AUTO: 73.5 %
OSMOLALITY SERPL CALC.SUM OF ELEC: 295 MOSM/KG (ref 275–295)
PLATELET # BLD AUTO: 219 10(3)UL (ref 150–450)
POTASSIUM SERPL-SCNC: 3.6 MMOL/L (ref 3.5–5.1)
PROT SERPL-MCNC: 7.1 G/DL (ref 6.4–8.2)
PROT SERPL-MCNC: 7.2 G/DL (ref 6.4–8.2)
RBC # BLD AUTO: 3.93 X10(6)UL
SODIUM SERPL-SCNC: 142 MMOL/L (ref 136–145)
WBC # BLD AUTO: 6.3 X10(3) UL (ref 4–11)

## 2022-04-11 PROCEDURE — 84520 ASSAY OF UREA NITROGEN: CPT

## 2022-04-11 PROCEDURE — 85652 RBC SED RATE AUTOMATED: CPT

## 2022-04-11 PROCEDURE — 85025 COMPLETE CBC W/AUTO DIFF WBC: CPT

## 2022-04-11 PROCEDURE — 36592 COLLECT BLOOD FROM PICC: CPT

## 2022-04-11 PROCEDURE — 86140 C-REACTIVE PROTEIN: CPT

## 2022-04-11 PROCEDURE — 82248 BILIRUBIN DIRECT: CPT

## 2022-04-11 PROCEDURE — 82565 ASSAY OF CREATININE: CPT

## 2022-04-11 PROCEDURE — 80053 COMPREHEN METABOLIC PANEL: CPT

## 2022-04-18 ENCOUNTER — NURSE ONLY (OUTPATIENT)
Dept: HEMATOLOGY/ONCOLOGY | Facility: HOSPITAL | Age: 62
End: 2022-04-18
Attending: INTERNAL MEDICINE
Payer: COMMERCIAL

## 2022-04-18 DIAGNOSIS — M46.20 VERTEBRAL OSTEOMYELITIS (HCC): ICD-10-CM

## 2022-04-18 LAB
ALBUMIN SERPL-MCNC: 3.2 G/DL (ref 3.4–5)
ALP LIVER SERPL-CCNC: 115 U/L
ALT SERPL-CCNC: 46 U/L
AST SERPL-CCNC: 39 U/L (ref 15–37)
BASOPHILS # BLD AUTO: 0.01 X10(3) UL (ref 0–0.2)
BASOPHILS NFR BLD AUTO: 0.2 %
BILIRUB DIRECT SERPL-MCNC: 0.1 MG/DL (ref 0–0.2)
BILIRUB SERPL-MCNC: 0.5 MG/DL (ref 0.1–2)
BUN BLD-MCNC: 9 MG/DL (ref 7–18)
CREAT BLD-MCNC: 0.83 MG/DL
CRP SERPL-MCNC: 9.99 MG/DL (ref ?–0.3)
EOSINOPHIL # BLD AUTO: 0.33 X10(3) UL (ref 0–0.7)
EOSINOPHIL NFR BLD AUTO: 5.5 %
ERYTHROCYTE [DISTWIDTH] IN BLOOD BY AUTOMATED COUNT: 15.8 %
ERYTHROCYTE [SEDIMENTATION RATE] IN BLOOD: 28 MM/HR
HCT VFR BLD AUTO: 31.5 %
HGB BLD-MCNC: 10.2 G/DL
IMM GRANULOCYTES # BLD AUTO: 0.03 X10(3) UL (ref 0–1)
IMM GRANULOCYTES NFR BLD: 0.5 %
LYMPHOCYTES # BLD AUTO: 0.6 X10(3) UL (ref 1–4)
LYMPHOCYTES NFR BLD AUTO: 10.1 %
MCH RBC QN AUTO: 26 PG (ref 26–34)
MCHC RBC AUTO-ENTMCNC: 32.4 G/DL (ref 31–37)
MCV RBC AUTO: 80.4 FL
MONOCYTES # BLD AUTO: 0.42 X10(3) UL (ref 0.1–1)
MONOCYTES NFR BLD AUTO: 7 %
NEUTROPHILS # BLD AUTO: 4.58 X10 (3) UL (ref 1.5–7.7)
NEUTROPHILS # BLD AUTO: 4.58 X10(3) UL (ref 1.5–7.7)
NEUTROPHILS NFR BLD AUTO: 76.7 %
PLATELET # BLD AUTO: 145 10(3)UL (ref 150–450)
PROT SERPL-MCNC: 7.1 G/DL (ref 6.4–8.2)
RBC # BLD AUTO: 3.92 X10(6)UL
WBC # BLD AUTO: 6 X10(3) UL (ref 4–11)

## 2022-04-18 PROCEDURE — 82565 ASSAY OF CREATININE: CPT

## 2022-04-18 PROCEDURE — 85652 RBC SED RATE AUTOMATED: CPT

## 2022-04-18 PROCEDURE — 85025 COMPLETE CBC W/AUTO DIFF WBC: CPT

## 2022-04-18 PROCEDURE — 86140 C-REACTIVE PROTEIN: CPT

## 2022-04-18 PROCEDURE — 36592 COLLECT BLOOD FROM PICC: CPT

## 2022-04-18 PROCEDURE — 84520 ASSAY OF UREA NITROGEN: CPT

## 2022-04-18 PROCEDURE — 80076 HEPATIC FUNCTION PANEL: CPT

## 2022-04-18 NOTE — PROGRESS NOTES
Education Record    Learner:  Patient and Friend    Disease / Diagnosis: CLL, PICC line dressing change    Barriers / Limitations:  None   Comments:    Method:  Discussion   Comments:    General Topics:  Plan of care reviewed   Comments:    Outcome:  Shows understanding   Comments:    Labs drawn and results faxed to [facility] [phone] per order. Dressing to PICC changed. Pt noted to have red rash to backs of both upper arms. Felt warm to touch. Pt denies any fevers, itching etc. Pt Instructed to notify her MD regarding rash and go to immediate care/ER for any worsening symptoms.

## 2022-04-25 ENCOUNTER — NURSE ONLY (OUTPATIENT)
Dept: HEMATOLOGY/ONCOLOGY | Facility: HOSPITAL | Age: 62
End: 2022-04-25
Attending: INTERNAL MEDICINE
Payer: COMMERCIAL

## 2022-04-25 DIAGNOSIS — M46.20 VERTEBRAL OSTEOMYELITIS (HCC): ICD-10-CM

## 2022-04-25 LAB
ALBUMIN SERPL-MCNC: 3 G/DL (ref 3.4–5)
ALP LIVER SERPL-CCNC: 102 U/L
ALT SERPL-CCNC: 79 U/L
AST SERPL-CCNC: 54 U/L (ref 15–37)
BASOPHILS # BLD: 0 X10(3) UL (ref 0–0.2)
BASOPHILS NFR BLD: 0 %
BILIRUB DIRECT SERPL-MCNC: <0.1 MG/DL (ref 0–0.2)
BILIRUB SERPL-MCNC: 0.4 MG/DL (ref 0.1–2)
BUN BLD-MCNC: 12 MG/DL (ref 7–18)
CREAT BLD-MCNC: 0.64 MG/DL
CRP SERPL-MCNC: 5.77 MG/DL (ref ?–0.3)
EOSINOPHIL # BLD: 0.83 X10(3) UL (ref 0–0.7)
EOSINOPHIL NFR BLD: 6 %
ERYTHROCYTE [DISTWIDTH] IN BLOOD BY AUTOMATED COUNT: 15.3 %
ERYTHROCYTE [SEDIMENTATION RATE] IN BLOOD: 10 MM/HR
HCT VFR BLD AUTO: 36.2 %
HGB BLD-MCNC: 11.2 G/DL
LYMPHOCYTES NFR BLD: 33 %
LYMPHOCYTES NFR BLD: 4.59 X10(3) UL (ref 1–4)
MCH RBC QN AUTO: 25 PG (ref 26–34)
MCHC RBC AUTO-ENTMCNC: 30.9 G/DL (ref 31–37)
MCV RBC AUTO: 80.8 FL
MONOCYTES # BLD: 0.83 X10(3) UL (ref 0.1–1)
MONOCYTES NFR BLD: 6 %
NEUTROPHILS # BLD AUTO: 5.91 X10 (3) UL (ref 1.5–7.7)
NEUTROPHILS NFR BLD: 54 %
NEUTS BAND NFR BLD: 1 %
NEUTS HYPERSEG # BLD: 7.65 X10(3) UL (ref 1.5–7.7)
PLATELET # BLD AUTO: 239 10(3)UL (ref 150–450)
PLATELET MORPHOLOGY: NORMAL
PROT SERPL-MCNC: 6.4 G/DL (ref 6.4–8.2)
RBC # BLD AUTO: 4.48 X10(6)UL
TOTAL CELLS COUNTED BLD: 100
WBC # BLD AUTO: 13.9 X10(3) UL (ref 4–11)

## 2022-04-25 PROCEDURE — 82565 ASSAY OF CREATININE: CPT

## 2022-04-25 PROCEDURE — 36592 COLLECT BLOOD FROM PICC: CPT

## 2022-04-25 PROCEDURE — 85027 COMPLETE CBC AUTOMATED: CPT

## 2022-04-25 PROCEDURE — 84520 ASSAY OF UREA NITROGEN: CPT

## 2022-04-25 PROCEDURE — 85007 BL SMEAR W/DIFF WBC COUNT: CPT

## 2022-04-25 PROCEDURE — 86140 C-REACTIVE PROTEIN: CPT

## 2022-04-25 PROCEDURE — 80076 HEPATIC FUNCTION PANEL: CPT

## 2022-04-25 PROCEDURE — 85652 RBC SED RATE AUTOMATED: CPT

## 2022-04-25 PROCEDURE — 85025 COMPLETE CBC W/AUTO DIFF WBC: CPT

## 2022-04-25 NOTE — PROGRESS NOTES
Pt noted Cefoxitin allergy per [facility] ID that started last week. She is taking PO benadryl with some relief. Rash still diffuse- pt tolerating minimal itching. Labs draw and picc dressing changed. Added allergy to epic. Pt departed stable.

## 2022-05-02 ENCOUNTER — NURSE ONLY (OUTPATIENT)
Dept: HEMATOLOGY/ONCOLOGY | Facility: HOSPITAL | Age: 62
End: 2022-05-02
Attending: INTERNAL MEDICINE
Payer: COMMERCIAL

## 2022-05-02 DIAGNOSIS — M46.20 VERTEBRAL OSTEOMYELITIS (HCC): Primary | ICD-10-CM

## 2022-05-02 LAB
ALBUMIN SERPL-MCNC: 3.4 G/DL (ref 3.4–5)
ALP LIVER SERPL-CCNC: 103 U/L
ALT SERPL-CCNC: 58 U/L
AST SERPL-CCNC: 46 U/L (ref 15–37)
BASOPHILS # BLD AUTO: 0.04 X10(3) UL (ref 0–0.2)
BASOPHILS NFR BLD AUTO: 0.4 %
BILIRUB DIRECT SERPL-MCNC: 0.1 MG/DL (ref 0–0.2)
BILIRUB SERPL-MCNC: 0.6 MG/DL (ref 0.1–2)
BUN BLD-MCNC: 23 MG/DL (ref 7–18)
CK SERPL-CCNC: 42 U/L
CREAT BLD-MCNC: 0.8 MG/DL
CRP SERPL-MCNC: 1.4 MG/DL (ref ?–0.3)
EOSINOPHIL # BLD AUTO: 0.01 X10(3) UL (ref 0–0.7)
EOSINOPHIL NFR BLD AUTO: 0.1 %
ERYTHROCYTE [DISTWIDTH] IN BLOOD BY AUTOMATED COUNT: 16.3 %
ERYTHROCYTE [SEDIMENTATION RATE] IN BLOOD: 16 MM/HR
HCT VFR BLD AUTO: 33.8 %
HGB BLD-MCNC: 10.3 G/DL
IMM GRANULOCYTES # BLD AUTO: 0.12 X10(3) UL (ref 0–1)
IMM GRANULOCYTES NFR BLD: 1.1 %
LYMPHOCYTES # BLD AUTO: 1.76 X10(3) UL (ref 1–4)
LYMPHOCYTES NFR BLD AUTO: 16.8 %
MCH RBC QN AUTO: 25.1 PG (ref 26–34)
MCHC RBC AUTO-ENTMCNC: 30.5 G/DL (ref 31–37)
MCV RBC AUTO: 82.2 FL
MONOCYTES # BLD AUTO: 0.42 X10(3) UL (ref 0.1–1)
MONOCYTES NFR BLD AUTO: 4 %
NEUTROPHILS # BLD AUTO: 8.13 X10 (3) UL (ref 1.5–7.7)
NEUTROPHILS # BLD AUTO: 8.13 X10(3) UL (ref 1.5–7.7)
NEUTROPHILS NFR BLD AUTO: 77.6 %
PLATELET # BLD AUTO: 292 10(3)UL (ref 150–450)
PROT SERPL-MCNC: 7.2 G/DL (ref 6.4–8.2)
RBC # BLD AUTO: 4.11 X10(6)UL
WBC # BLD AUTO: 10.5 X10(3) UL (ref 4–11)

## 2022-05-02 PROCEDURE — 84520 ASSAY OF UREA NITROGEN: CPT

## 2022-05-02 PROCEDURE — 36592 COLLECT BLOOD FROM PICC: CPT

## 2022-05-02 PROCEDURE — 85652 RBC SED RATE AUTOMATED: CPT

## 2022-05-02 PROCEDURE — 86140 C-REACTIVE PROTEIN: CPT

## 2022-05-02 PROCEDURE — 85025 COMPLETE CBC W/AUTO DIFF WBC: CPT

## 2022-05-02 PROCEDURE — 82550 ASSAY OF CK (CPK): CPT

## 2022-05-02 PROCEDURE — 80076 HEPATIC FUNCTION PANEL: CPT

## 2022-05-02 PROCEDURE — 82565 ASSAY OF CREATININE: CPT

## 2022-05-02 RX ORDER — SODIUM CHLORIDE 0.9 % (FLUSH) 0.9 %
10 SYRINGE (ML) INJECTION ONCE
OUTPATIENT
Start: 2022-05-09

## 2022-05-02 NOTE — PROGRESS NOTES
Education Record    Learner:  Patient    Disease / Diagnosis: CLL, dressing change    Barriers / Limitations:  None   Comments:    Method:  Discussion   Comments:    General Topics:  Plan of care reviewed   Comments:    Outcome:  Shows understanding   Comments:    Labs drawn and PICC line dressing changed per protocol. PICC site intact. Lab results faxed to [facility] and ID. Declined printed AVS. Reviewed next appointment. Discharged home in stable condition.

## 2022-05-09 ENCOUNTER — NURSE ONLY (OUTPATIENT)
Dept: HEMATOLOGY/ONCOLOGY | Facility: HOSPITAL | Age: 62
End: 2022-05-09
Attending: INTERNAL MEDICINE
Payer: COMMERCIAL

## 2022-05-09 DIAGNOSIS — M46.20 VERTEBRAL OSTEOMYELITIS (HCC): Primary | ICD-10-CM

## 2022-05-09 LAB
ALBUMIN SERPL-MCNC: 3.3 G/DL (ref 3.4–5)
ALBUMIN SERPL-MCNC: 3.3 G/DL (ref 3.4–5)
ALBUMIN/GLOB SERPL: 0.9 {RATIO} (ref 1–2)
ALP LIVER SERPL-CCNC: 89 U/L
ALP LIVER SERPL-CCNC: 89 U/L
ALT SERPL-CCNC: 52 U/L
ALT SERPL-CCNC: 52 U/L
ANION GAP SERPL CALC-SCNC: 4 MMOL/L (ref 0–18)
AST SERPL-CCNC: 30 U/L (ref 15–37)
AST SERPL-CCNC: 30 U/L (ref 15–37)
BASOPHILS # BLD AUTO: 0.02 X10(3) UL (ref 0–0.2)
BASOPHILS NFR BLD AUTO: 0.2 %
BILIRUB DIRECT SERPL-MCNC: <0.1 MG/DL (ref 0–0.2)
BILIRUB SERPL-MCNC: 0.3 MG/DL (ref 0.1–2)
BILIRUB SERPL-MCNC: 0.3 MG/DL (ref 0.1–2)
BUN BLD-MCNC: 20 MG/DL (ref 7–18)
CALCIUM BLD-MCNC: 8.6 MG/DL (ref 8.5–10.1)
CHLORIDE SERPL-SCNC: 110 MMOL/L (ref 98–112)
CK SERPL-CCNC: 36 U/L
CO2 SERPL-SCNC: 26 MMOL/L (ref 21–32)
CREAT BLD-MCNC: 0.64 MG/DL
CRP SERPL-MCNC: 0.32 MG/DL (ref ?–0.3)
EOSINOPHIL # BLD AUTO: 0 X10(3) UL (ref 0–0.7)
EOSINOPHIL NFR BLD AUTO: 0 %
ERYTHROCYTE [DISTWIDTH] IN BLOOD BY AUTOMATED COUNT: 17.3 %
ERYTHROCYTE [SEDIMENTATION RATE] IN BLOOD: 15 MM/HR
FASTING STATUS PATIENT QL REPORTED: NO
GLOBULIN PLAS-MCNC: 3.7 G/DL (ref 2.8–4.4)
GLUCOSE BLD-MCNC: 163 MG/DL (ref 70–99)
HCT VFR BLD AUTO: 34.6 %
HGB BLD-MCNC: 10.5 G/DL
IMM GRANULOCYTES # BLD AUTO: 0.06 X10(3) UL (ref 0–1)
IMM GRANULOCYTES NFR BLD: 0.6 %
LYMPHOCYTES # BLD AUTO: 1.33 X10(3) UL (ref 1–4)
LYMPHOCYTES NFR BLD AUTO: 12.7 %
MCH RBC QN AUTO: 25.4 PG (ref 26–34)
MCHC RBC AUTO-ENTMCNC: 30.3 G/DL (ref 31–37)
MCV RBC AUTO: 83.8 FL
MONOCYTES # BLD AUTO: 0.15 X10(3) UL (ref 0.1–1)
MONOCYTES NFR BLD AUTO: 1.4 %
NEUTROPHILS # BLD AUTO: 8.92 X10 (3) UL (ref 1.5–7.7)
NEUTROPHILS # BLD AUTO: 8.92 X10(3) UL (ref 1.5–7.7)
NEUTROPHILS NFR BLD AUTO: 85.1 %
OSMOLALITY SERPL CALC.SUM OF ELEC: 296 MOSM/KG (ref 275–295)
PLATELET # BLD AUTO: 251 10(3)UL (ref 150–450)
POTASSIUM SERPL-SCNC: 3.9 MMOL/L (ref 3.5–5.1)
PROT SERPL-MCNC: 7 G/DL (ref 6.4–8.2)
PROT SERPL-MCNC: 7 G/DL (ref 6.4–8.2)
RBC # BLD AUTO: 4.13 X10(6)UL
SODIUM SERPL-SCNC: 140 MMOL/L (ref 136–145)
WBC # BLD AUTO: 10.5 X10(3) UL (ref 4–11)

## 2022-05-09 PROCEDURE — 82248 BILIRUBIN DIRECT: CPT

## 2022-05-09 PROCEDURE — 85652 RBC SED RATE AUTOMATED: CPT

## 2022-05-09 PROCEDURE — 36592 COLLECT BLOOD FROM PICC: CPT

## 2022-05-09 PROCEDURE — 82550 ASSAY OF CK (CPK): CPT

## 2022-05-09 PROCEDURE — 86140 C-REACTIVE PROTEIN: CPT

## 2022-05-09 PROCEDURE — 80053 COMPREHEN METABOLIC PANEL: CPT

## 2022-05-09 PROCEDURE — 85025 COMPLETE CBC W/AUTO DIFF WBC: CPT

## 2022-05-09 RX ORDER — SODIUM CHLORIDE 0.9 % (FLUSH) 0.9 %
10 SYRINGE (ML) INJECTION ONCE
OUTPATIENT
Start: 2022-05-16

## 2022-05-09 NOTE — PROGRESS NOTES
Education Record    Learner:  Patient    Disease / Diagnosis: CLL, dressing change    Barriers / Limitations:  None   Comments:    Method:  Brief focused and Reinforcement   Comments:    General Topics:  Medication and Plan of care reviewed   Comments:    Outcome:  Shows understanding   Comments: Dressing change and labs drawn from right arm PICC line. Patient discharged in stable condition.

## 2022-05-16 ENCOUNTER — NURSE ONLY (OUTPATIENT)
Dept: HEMATOLOGY/ONCOLOGY | Facility: HOSPITAL | Age: 62
End: 2022-05-16
Attending: INTERNAL MEDICINE
Payer: COMMERCIAL

## 2022-05-16 DIAGNOSIS — M46.20 VERTEBRAL OSTEOMYELITIS (HCC): Primary | ICD-10-CM

## 2022-05-16 LAB
ALBUMIN SERPL-MCNC: 3.5 G/DL (ref 3.4–5)
ALBUMIN/GLOB SERPL: 0.9 {RATIO} (ref 1–2)
ALP LIVER SERPL-CCNC: 98 U/L
ALT SERPL-CCNC: 52 U/L
ANION GAP SERPL CALC-SCNC: 3 MMOL/L (ref 0–18)
AST SERPL-CCNC: 31 U/L (ref 15–37)
BASOPHILS # BLD AUTO: 0.02 X10(3) UL (ref 0–0.2)
BASOPHILS NFR BLD AUTO: 0.2 %
BILIRUB DIRECT SERPL-MCNC: <0.1 MG/DL (ref 0–0.2)
BILIRUB SERPL-MCNC: 0.3 MG/DL (ref 0.1–2)
BUN BLD-MCNC: 22 MG/DL (ref 7–18)
CALCIUM BLD-MCNC: 8.7 MG/DL (ref 8.5–10.1)
CHLORIDE SERPL-SCNC: 110 MMOL/L (ref 98–112)
CK SERPL-CCNC: 40 U/L
CO2 SERPL-SCNC: 24 MMOL/L (ref 21–32)
CREAT BLD-MCNC: 0.76 MG/DL
CRP SERPL-MCNC: 0.67 MG/DL (ref ?–0.3)
EOSINOPHIL # BLD AUTO: 0.01 X10(3) UL (ref 0–0.7)
EOSINOPHIL NFR BLD AUTO: 0.1 %
ERYTHROCYTE [DISTWIDTH] IN BLOOD BY AUTOMATED COUNT: 16.9 %
ERYTHROCYTE [SEDIMENTATION RATE] IN BLOOD: 60 MM/HR
FASTING STATUS PATIENT QL REPORTED: NO
GLOBULIN PLAS-MCNC: 3.7 G/DL (ref 2.8–4.4)
GLUCOSE BLD-MCNC: 187 MG/DL (ref 70–99)
HCT VFR BLD AUTO: 36.3 %
HGB BLD-MCNC: 10.9 G/DL
IMM GRANULOCYTES # BLD AUTO: 0.05 X10(3) UL (ref 0–1)
IMM GRANULOCYTES NFR BLD: 0.4 %
LYMPHOCYTES # BLD AUTO: 1.37 X10(3) UL (ref 1–4)
LYMPHOCYTES NFR BLD AUTO: 10.3 %
MCH RBC QN AUTO: 25.2 PG (ref 26–34)
MCHC RBC AUTO-ENTMCNC: 30 G/DL (ref 31–37)
MCV RBC AUTO: 83.8 FL
MONOCYTES # BLD AUTO: 0.15 X10(3) UL (ref 0.1–1)
MONOCYTES NFR BLD AUTO: 1.1 %
NEUTROPHILS # BLD AUTO: 11.68 X10 (3) UL (ref 1.5–7.7)
NEUTROPHILS # BLD AUTO: 11.68 X10(3) UL (ref 1.5–7.7)
NEUTROPHILS NFR BLD AUTO: 87.9 %
OSMOLALITY SERPL CALC.SUM OF ELEC: 292 MOSM/KG (ref 275–295)
PLATELET # BLD AUTO: 238 10(3)UL (ref 150–450)
POTASSIUM SERPL-SCNC: 4 MMOL/L (ref 3.5–5.1)
PROT SERPL-MCNC: 7.2 G/DL (ref 6.4–8.2)
RBC # BLD AUTO: 4.33 X10(6)UL
SODIUM SERPL-SCNC: 137 MMOL/L (ref 136–145)
WBC # BLD AUTO: 13.3 X10(3) UL (ref 4–11)

## 2022-05-16 PROCEDURE — 36592 COLLECT BLOOD FROM PICC: CPT

## 2022-05-16 PROCEDURE — 82550 ASSAY OF CK (CPK): CPT

## 2022-05-16 PROCEDURE — 82248 BILIRUBIN DIRECT: CPT

## 2022-05-16 PROCEDURE — 85025 COMPLETE CBC W/AUTO DIFF WBC: CPT

## 2022-05-16 PROCEDURE — 80053 COMPREHEN METABOLIC PANEL: CPT

## 2022-05-16 PROCEDURE — 85652 RBC SED RATE AUTOMATED: CPT

## 2022-05-16 PROCEDURE — 86140 C-REACTIVE PROTEIN: CPT

## 2022-05-16 RX ORDER — SODIUM CHLORIDE 0.9 % (FLUSH) 0.9 %
10 SYRINGE (ML) INJECTION ONCE
OUTPATIENT
Start: 2022-05-23

## 2022-05-23 ENCOUNTER — APPOINTMENT (OUTPATIENT)
Dept: HEMATOLOGY/ONCOLOGY | Facility: HOSPITAL | Age: 62
End: 2022-05-23
Attending: INTERNAL MEDICINE
Payer: COMMERCIAL

## 2022-05-23 DIAGNOSIS — M46.20 VERTEBRAL OSTEOMYELITIS (HCC): Primary | ICD-10-CM

## 2022-05-23 LAB
ALBUMIN SERPL-MCNC: 3.4 G/DL (ref 3.4–5)
ALBUMIN/GLOB SERPL: 0.9 {RATIO} (ref 1–2)
ALP LIVER SERPL-CCNC: 108 U/L
ALT SERPL-CCNC: 49 U/L
ANION GAP SERPL CALC-SCNC: 5 MMOL/L (ref 0–18)
AST SERPL-CCNC: 32 U/L (ref 15–37)
BASOPHILS # BLD AUTO: 0.03 X10(3) UL (ref 0–0.2)
BASOPHILS NFR BLD AUTO: 0.2 %
BILIRUB DIRECT SERPL-MCNC: <0.1 MG/DL (ref 0–0.2)
BILIRUB SERPL-MCNC: 0.3 MG/DL (ref 0.1–2)
BUN BLD-MCNC: 26 MG/DL (ref 7–18)
CALCIUM BLD-MCNC: 8.5 MG/DL (ref 8.5–10.1)
CHLORIDE SERPL-SCNC: 109 MMOL/L (ref 98–112)
CK SERPL-CCNC: 50 U/L
CO2 SERPL-SCNC: 24 MMOL/L (ref 21–32)
CREAT BLD-MCNC: 0.93 MG/DL
CRP SERPL-MCNC: 1.24 MG/DL (ref ?–0.3)
EOSINOPHIL # BLD AUTO: 0.02 X10(3) UL (ref 0–0.7)
EOSINOPHIL NFR BLD AUTO: 0.1 %
ERYTHROCYTE [DISTWIDTH] IN BLOOD BY AUTOMATED COUNT: 16.7 %
ERYTHROCYTE [SEDIMENTATION RATE] IN BLOOD: 11 MM/HR
FASTING STATUS PATIENT QL REPORTED: NO
GLOBULIN PLAS-MCNC: 4 G/DL (ref 2.8–4.4)
GLUCOSE BLD-MCNC: 126 MG/DL (ref 70–99)
HCT VFR BLD AUTO: 37.5 %
HGB BLD-MCNC: 11.3 G/DL
IMM GRANULOCYTES # BLD AUTO: 0.07 X10(3) UL (ref 0–1)
IMM GRANULOCYTES NFR BLD: 0.4 %
LYMPHOCYTES # BLD AUTO: 1.38 X10(3) UL (ref 1–4)
LYMPHOCYTES NFR BLD AUTO: 8.7 %
MCH RBC QN AUTO: 25 PG (ref 26–34)
MCHC RBC AUTO-ENTMCNC: 30.1 G/DL (ref 31–37)
MCV RBC AUTO: 83 FL
MONOCYTES # BLD AUTO: 0.27 X10(3) UL (ref 0.1–1)
MONOCYTES NFR BLD AUTO: 1.7 %
NEUTROPHILS # BLD AUTO: 14.12 X10 (3) UL (ref 1.5–7.7)
NEUTROPHILS # BLD AUTO: 14.12 X10(3) UL (ref 1.5–7.7)
NEUTROPHILS NFR BLD AUTO: 88.9 %
OSMOLALITY SERPL CALC.SUM OF ELEC: 292 MOSM/KG (ref 275–295)
PLATELET # BLD AUTO: 264 10(3)UL (ref 150–450)
POTASSIUM SERPL-SCNC: 4.5 MMOL/L (ref 3.5–5.1)
PROT SERPL-MCNC: 7.4 G/DL (ref 6.4–8.2)
RBC # BLD AUTO: 4.52 X10(6)UL
SODIUM SERPL-SCNC: 138 MMOL/L (ref 136–145)
WBC # BLD AUTO: 15.9 X10(3) UL (ref 4–11)

## 2022-05-23 PROCEDURE — 85652 RBC SED RATE AUTOMATED: CPT

## 2022-05-23 PROCEDURE — 82550 ASSAY OF CK (CPK): CPT

## 2022-05-23 PROCEDURE — 85025 COMPLETE CBC W/AUTO DIFF WBC: CPT

## 2022-05-23 PROCEDURE — 86140 C-REACTIVE PROTEIN: CPT

## 2022-05-23 PROCEDURE — 80053 COMPREHEN METABOLIC PANEL: CPT

## 2022-05-23 PROCEDURE — 82248 BILIRUBIN DIRECT: CPT

## 2022-05-23 PROCEDURE — 36591 DRAW BLOOD OFF VENOUS DEVICE: CPT

## 2022-05-23 RX ORDER — SODIUM CHLORIDE 0.9 % (FLUSH) 0.9 %
10 SYRINGE (ML) INJECTION ONCE
OUTPATIENT
Start: 2022-05-30

## 2022-05-31 ENCOUNTER — NURSE ONLY (OUTPATIENT)
Dept: HEMATOLOGY/ONCOLOGY | Facility: HOSPITAL | Age: 62
End: 2022-05-31
Attending: INTERNAL MEDICINE
Payer: COMMERCIAL

## 2022-05-31 DIAGNOSIS — M46.20 VERTEBRAL OSTEOMYELITIS (HCC): Primary | ICD-10-CM

## 2022-05-31 LAB
ALBUMIN SERPL-MCNC: 3.4 G/DL (ref 3.4–5)
ALBUMIN/GLOB SERPL: 0.9 {RATIO} (ref 1–2)
ALP LIVER SERPL-CCNC: 99 U/L
ALT SERPL-CCNC: 43 U/L
ANION GAP SERPL CALC-SCNC: 5 MMOL/L (ref 0–18)
AST SERPL-CCNC: 31 U/L (ref 15–37)
BASOPHILS # BLD AUTO: 0.03 X10(3) UL (ref 0–0.2)
BASOPHILS NFR BLD AUTO: 0.3 %
BILIRUB DIRECT SERPL-MCNC: <0.1 MG/DL (ref 0–0.2)
BILIRUB SERPL-MCNC: 0.3 MG/DL (ref 0.1–2)
BUN BLD-MCNC: 24 MG/DL (ref 7–18)
CALCIUM BLD-MCNC: 8.8 MG/DL (ref 8.5–10.1)
CHLORIDE SERPL-SCNC: 109 MMOL/L (ref 98–112)
CK SERPL-CCNC: 85 U/L
CO2 SERPL-SCNC: 25 MMOL/L (ref 21–32)
CREAT BLD-MCNC: 0.96 MG/DL
CRP SERPL-MCNC: 1.06 MG/DL (ref ?–0.3)
EOSINOPHIL # BLD AUTO: 0.02 X10(3) UL (ref 0–0.7)
EOSINOPHIL NFR BLD AUTO: 0.2 %
ERYTHROCYTE [DISTWIDTH] IN BLOOD BY AUTOMATED COUNT: 16.4 %
ERYTHROCYTE [SEDIMENTATION RATE] IN BLOOD: 16 MM/HR
FASTING STATUS PATIENT QL REPORTED: NO
GLOBULIN PLAS-MCNC: 3.6 G/DL (ref 2.8–4.4)
GLUCOSE BLD-MCNC: 101 MG/DL (ref 70–99)
HCT VFR BLD AUTO: 34.6 %
HGB BLD-MCNC: 10.7 G/DL
IMM GRANULOCYTES # BLD AUTO: 0.07 X10(3) UL (ref 0–1)
IMM GRANULOCYTES NFR BLD: 0.6 %
LYMPHOCYTES # BLD AUTO: 1.38 X10(3) UL (ref 1–4)
LYMPHOCYTES NFR BLD AUTO: 11.6 %
MCH RBC QN AUTO: 24.8 PG (ref 26–34)
MCHC RBC AUTO-ENTMCNC: 30.9 G/DL (ref 31–37)
MCV RBC AUTO: 80.1 FL
MONOCYTES # BLD AUTO: 0.32 X10(3) UL (ref 0.1–1)
MONOCYTES NFR BLD AUTO: 2.7 %
NEUTROPHILS # BLD AUTO: 10.09 X10 (3) UL (ref 1.5–7.7)
NEUTROPHILS # BLD AUTO: 10.09 X10(3) UL (ref 1.5–7.7)
NEUTROPHILS NFR BLD AUTO: 84.6 %
OSMOLALITY SERPL CALC.SUM OF ELEC: 292 MOSM/KG (ref 275–295)
PLATELET # BLD AUTO: 259 10(3)UL (ref 150–450)
POTASSIUM SERPL-SCNC: 4.3 MMOL/L (ref 3.5–5.1)
PROT SERPL-MCNC: 7 G/DL (ref 6.4–8.2)
RBC # BLD AUTO: 4.32 X10(6)UL
SODIUM SERPL-SCNC: 139 MMOL/L (ref 136–145)
WBC # BLD AUTO: 11.9 X10(3) UL (ref 4–11)

## 2022-05-31 PROCEDURE — 82550 ASSAY OF CK (CPK): CPT

## 2022-05-31 PROCEDURE — 82248 BILIRUBIN DIRECT: CPT

## 2022-05-31 PROCEDURE — 85025 COMPLETE CBC W/AUTO DIFF WBC: CPT

## 2022-05-31 PROCEDURE — 86140 C-REACTIVE PROTEIN: CPT

## 2022-05-31 PROCEDURE — 80053 COMPREHEN METABOLIC PANEL: CPT

## 2022-05-31 PROCEDURE — 36592 COLLECT BLOOD FROM PICC: CPT

## 2022-05-31 PROCEDURE — 85652 RBC SED RATE AUTOMATED: CPT

## 2022-05-31 RX ORDER — SODIUM CHLORIDE 0.9 % (FLUSH) 0.9 %
10 SYRINGE (ML) INJECTION ONCE
OUTPATIENT
Start: 2022-06-06

## 2022-06-01 ENCOUNTER — TELEPHONE (OUTPATIENT)
Dept: HEMATOLOGY/ONCOLOGY | Facility: HOSPITAL | Age: 62
End: 2022-06-01

## 2022-06-01 NOTE — TELEPHONE ENCOUNTER
Labs results from 5/31/22 faxed to [facility] today.

## 2022-06-07 ENCOUNTER — NURSE ONLY (OUTPATIENT)
Dept: HEMATOLOGY/ONCOLOGY | Facility: HOSPITAL | Age: 62
End: 2022-06-07
Attending: INTERNAL MEDICINE
Payer: COMMERCIAL

## 2022-06-07 DIAGNOSIS — M86.9 OSTEOMYELITIS OF OTHER SITE, UNSPECIFIED TYPE (HCC): Primary | ICD-10-CM

## 2022-06-07 LAB
ALBUMIN SERPL-MCNC: 3.4 G/DL (ref 3.4–5)
ALP LIVER SERPL-CCNC: 105 U/L
ALT SERPL-CCNC: 37 U/L
AST SERPL-CCNC: 21 U/L (ref 15–37)
BASOPHILS # BLD AUTO: 0.03 X10(3) UL (ref 0–0.2)
BASOPHILS NFR BLD AUTO: 0.2 %
BILIRUB DIRECT SERPL-MCNC: <0.1 MG/DL (ref 0–0.2)
BILIRUB SERPL-MCNC: 0.4 MG/DL (ref 0.1–2)
BUN BLD-MCNC: 23 MG/DL (ref 7–18)
CK SERPL-CCNC: 75 U/L
CREAT BLD-MCNC: 0.7 MG/DL
CRP SERPL-MCNC: 1.99 MG/DL (ref ?–0.3)
EOSINOPHIL # BLD AUTO: 0.03 X10(3) UL (ref 0–0.7)
EOSINOPHIL NFR BLD AUTO: 0.2 %
ERYTHROCYTE [DISTWIDTH] IN BLOOD BY AUTOMATED COUNT: 16.4 %
ERYTHROCYTE [SEDIMENTATION RATE] IN BLOOD: 19 MM/HR
HCT VFR BLD AUTO: 36.7 %
HGB BLD-MCNC: 11.1 G/DL
IMM GRANULOCYTES # BLD AUTO: 0.05 X10(3) UL (ref 0–1)
IMM GRANULOCYTES NFR BLD: 0.4 %
LYMPHOCYTES # BLD AUTO: 1.36 X10(3) UL (ref 1–4)
LYMPHOCYTES NFR BLD AUTO: 10.8 %
MCH RBC QN AUTO: 24.3 PG (ref 26–34)
MCHC RBC AUTO-ENTMCNC: 30.2 G/DL (ref 31–37)
MCV RBC AUTO: 80.3 FL
MONOCYTES # BLD AUTO: 0.4 X10(3) UL (ref 0.1–1)
MONOCYTES NFR BLD AUTO: 3.2 %
NEUTROPHILS # BLD AUTO: 10.71 X10 (3) UL (ref 1.5–7.7)
NEUTROPHILS # BLD AUTO: 10.71 X10(3) UL (ref 1.5–7.7)
NEUTROPHILS NFR BLD AUTO: 85.2 %
PLATELET # BLD AUTO: 256 10(3)UL (ref 150–450)
PROT SERPL-MCNC: 7.3 G/DL (ref 6.4–8.2)
RBC # BLD AUTO: 4.57 X10(6)UL
WBC # BLD AUTO: 12.6 X10(3) UL (ref 4–11)

## 2022-06-07 PROCEDURE — 82565 ASSAY OF CREATININE: CPT

## 2022-06-07 PROCEDURE — 85025 COMPLETE CBC W/AUTO DIFF WBC: CPT

## 2022-06-07 PROCEDURE — 85652 RBC SED RATE AUTOMATED: CPT

## 2022-06-07 PROCEDURE — 82550 ASSAY OF CK (CPK): CPT

## 2022-06-07 PROCEDURE — 84520 ASSAY OF UREA NITROGEN: CPT

## 2022-06-07 PROCEDURE — 36592 COLLECT BLOOD FROM PICC: CPT

## 2022-06-07 PROCEDURE — 86140 C-REACTIVE PROTEIN: CPT

## 2022-06-07 PROCEDURE — 80076 HEPATIC FUNCTION PANEL: CPT

## 2022-06-07 NOTE — PROGRESS NOTES
Education Record    Learner:  Patient     Disease / Diagnosis: Osteomylitis    Barriers / Limitations:  None   Comments:    Method:  Brief focused   Comments:    General Topics:  Plan of care reviewed   Comments:    Outcome:  Shows understanding   Comments:

## 2022-06-08 ENCOUNTER — DOCUMENTATION ONLY (OUTPATIENT)
Dept: HEMATOLOGY/ONCOLOGY | Facility: HOSPITAL | Age: 62
End: 2022-06-08

## 2022-06-08 NOTE — PROGRESS NOTES
6/7/22 lab results faxed to [facility] at [facility] at [phone] per request. Message sent to all infusion RNs in [place] to ensure labs faxed weekly.

## 2022-06-13 NOTE — PROGRESS NOTES
Received fax from Dr. Obdulia Esparza at [place] [address] pt is managed by [facility] and they do not want weekly labs being ordered under MD name. Kvaya Beatty to speak with patient about need for [facility] candelario CANNON or Phong Silva MD with privileges to [facility] orders. Pt is scheduled for next labs and drsg change tomorrow.

## 2022-06-14 ENCOUNTER — NURSE ONLY (OUTPATIENT)
Dept: HEMATOLOGY/ONCOLOGY | Facility: HOSPITAL | Age: 62
End: 2022-06-14
Attending: INTERNAL MEDICINE
Payer: COMMERCIAL

## 2022-06-14 ENCOUNTER — TELEPHONE (OUTPATIENT)
Dept: HEMATOLOGY/ONCOLOGY | Facility: HOSPITAL | Age: 62
End: 2022-06-14

## 2022-06-14 DIAGNOSIS — M46.20 VERTEBRAL OSTEOMYELITIS (HCC): Primary | ICD-10-CM

## 2022-06-14 LAB
ALBUMIN SERPL-MCNC: 3.2 G/DL (ref 3.4–5)
ALBUMIN/GLOB SERPL: 0.9 {RATIO} (ref 1–2)
ALP LIVER SERPL-CCNC: 114 U/L
ALT SERPL-CCNC: 41 U/L
ANION GAP SERPL CALC-SCNC: 6 MMOL/L (ref 0–18)
AST SERPL-CCNC: 25 U/L (ref 15–37)
BASOPHILS # BLD AUTO: 0.04 X10(3) UL (ref 0–0.2)
BASOPHILS NFR BLD AUTO: 0.3 %
BILIRUB DIRECT SERPL-MCNC: <0.1 MG/DL (ref 0–0.2)
BILIRUB SERPL-MCNC: 0.3 MG/DL (ref 0.1–2)
BUN BLD-MCNC: 25 MG/DL (ref 7–18)
CALCIUM BLD-MCNC: 9 MG/DL (ref 8.5–10.1)
CHLORIDE SERPL-SCNC: 108 MMOL/L (ref 98–112)
CK SERPL-CCNC: 57 U/L
CO2 SERPL-SCNC: 25 MMOL/L (ref 21–32)
CREAT BLD-MCNC: 0.74 MG/DL
CRP SERPL-MCNC: 2.39 MG/DL (ref ?–0.3)
EOSINOPHIL # BLD AUTO: 0.09 X10(3) UL (ref 0–0.7)
EOSINOPHIL NFR BLD AUTO: 0.8 %
ERYTHROCYTE [DISTWIDTH] IN BLOOD BY AUTOMATED COUNT: 16.2 %
ERYTHROCYTE [SEDIMENTATION RATE] IN BLOOD: 24 MM/HR
FASTING STATUS PATIENT QL REPORTED: NO
GLOBULIN PLAS-MCNC: 3.6 G/DL (ref 2.8–4.4)
GLUCOSE BLD-MCNC: 190 MG/DL (ref 70–99)
HCT VFR BLD AUTO: 34.7 %
HGB BLD-MCNC: 10.8 G/DL
IMM GRANULOCYTES # BLD AUTO: 0.04 X10(3) UL (ref 0–1)
IMM GRANULOCYTES NFR BLD: 0.3 %
LYMPHOCYTES # BLD AUTO: 1.46 X10(3) UL (ref 1–4)
LYMPHOCYTES NFR BLD AUTO: 12.7 %
MCH RBC QN AUTO: 24.1 PG (ref 26–34)
MCHC RBC AUTO-ENTMCNC: 31.1 G/DL (ref 31–37)
MCV RBC AUTO: 77.5 FL
MONOCYTES # BLD AUTO: 0.51 X10(3) UL (ref 0.1–1)
MONOCYTES NFR BLD AUTO: 4.4 %
NEUTROPHILS # BLD AUTO: 9.37 X10 (3) UL (ref 1.5–7.7)
NEUTROPHILS # BLD AUTO: 9.37 X10(3) UL (ref 1.5–7.7)
NEUTROPHILS NFR BLD AUTO: 81.5 %
OSMOLALITY SERPL CALC.SUM OF ELEC: 297 MOSM/KG (ref 275–295)
PLATELET # BLD AUTO: 231 10(3)UL (ref 150–450)
POTASSIUM SERPL-SCNC: 3.9 MMOL/L (ref 3.5–5.1)
PROT SERPL-MCNC: 6.8 G/DL (ref 6.4–8.2)
RBC # BLD AUTO: 4.48 X10(6)UL
SODIUM SERPL-SCNC: 139 MMOL/L (ref 136–145)
WBC # BLD AUTO: 11.5 X10(3) UL (ref 4–11)

## 2022-06-14 PROCEDURE — 85025 COMPLETE CBC W/AUTO DIFF WBC: CPT

## 2022-06-14 PROCEDURE — 82248 BILIRUBIN DIRECT: CPT

## 2022-06-14 PROCEDURE — 82550 ASSAY OF CK (CPK): CPT

## 2022-06-14 PROCEDURE — 86140 C-REACTIVE PROTEIN: CPT

## 2022-06-14 PROCEDURE — 85652 RBC SED RATE AUTOMATED: CPT

## 2022-06-14 PROCEDURE — 36592 COLLECT BLOOD FROM PICC: CPT

## 2022-06-14 PROCEDURE — 80053 COMPREHEN METABOLIC PANEL: CPT

## 2022-06-14 RX ORDER — SODIUM CHLORIDE 0.9 % (FLUSH) 0.9 %
10 SYRINGE (ML) INJECTION ONCE
OUTPATIENT
Start: 2022-06-20

## 2022-06-14 NOTE — TELEPHONE ENCOUNTER
Spoke with Mona Samayoa from [facility]. . that Dr. Marcos Meier is no longer ordering labs on their MD's behalf because she has not seen the patient since March 2022. Per Mona Samayoa, put Dr. Radha Villasenor as the ordering MD for weekly CBC with diff, BUN, creatinine, LFT, ESR, CRP. [facility] again informing her that We need to have another MD to [facility] on behalf of Dr. Darron Councilman. Patient is aware of this and give Dr. Levon Lee as her Family Medicine MD. Unable to put orders under Dr. Shawanda Chandler. Mona Samayoa aware. Patient also aware that we are not able to put orders for these doctors and that she may have to go to [facility]. Patient got upset, but explained that we just wanted to keep her aware of what is going on. Patient said she will cancel her next appointment on Monday. Scheduled for 6/21/22 for CLL and dressing change.

## 2022-06-14 NOTE — TELEPHONE ENCOUNTER
Called and left a message for [address] their office that we are no longer able to draw weekly labs and PICC line dressing changes as we do not have an ordering MD to cosign for Dr. Sang Chavez. Patient aware of this, as well.

## 2022-06-14 NOTE — PROGRESS NOTES
Education Record    Learner:  Patient    Disease / Diagnosis: PICC dressing change    Barriers / Limitations:  None   Comments:    Method:  Brief focused and Reinforcement   Comments:    General Topics:  Diet, Medication, Side effects and symptom management and Plan of care reviewed   Comments:    Outcome:  Shows understanding   Comments: Right arm PICC dressing changed, labs drawn, patient discharged in stable condition.

## 2022-06-20 ENCOUNTER — APPOINTMENT (OUTPATIENT)
Dept: HEMATOLOGY/ONCOLOGY | Facility: HOSPITAL | Age: 62
End: 2022-06-20
Attending: INTERNAL MEDICINE
Payer: COMMERCIAL

## (undated) NOTE — LETTER
[facility] [address], [address]  Consent for Procedure/Sedation    Date: 10/4/21`    Time: 1319      1. I authorize the performance upon [facility] the following:  Kyphoplasty     2.  I authorize Dr. Tushar Rowe (and whomever i Medical Record #: [id number]